# Patient Record
Sex: MALE | Race: WHITE | HISPANIC OR LATINO | Employment: UNEMPLOYED | URBAN - METROPOLITAN AREA
[De-identification: names, ages, dates, MRNs, and addresses within clinical notes are randomized per-mention and may not be internally consistent; named-entity substitution may affect disease eponyms.]

---

## 2023-05-16 ENCOUNTER — TRANSFERRED RECORDS (OUTPATIENT)
Dept: HEALTH INFORMATION MANAGEMENT | Facility: CLINIC | Age: 78
End: 2023-05-16

## 2023-06-21 ENCOUNTER — MEDICAL CORRESPONDENCE (OUTPATIENT)
Dept: HEALTH INFORMATION MANAGEMENT | Facility: CLINIC | Age: 78
End: 2023-06-21

## 2023-06-22 ENCOUNTER — TRANSCRIBE ORDERS (OUTPATIENT)
Dept: OTHER | Age: 78
End: 2023-06-22

## 2023-06-22 DIAGNOSIS — H33.313 RETINAL TEAR OF BOTH EYES: Primary | ICD-10-CM

## 2023-06-26 ENCOUNTER — TELEPHONE (OUTPATIENT)
Dept: OPHTHALMOLOGY | Facility: CLINIC | Age: 78
End: 2023-06-26

## 2023-06-26 NOTE — TELEPHONE ENCOUNTER
[facility] Call Center    Phone Message    May a detailed message be left on voicemail: yes     Reason for Call: Appointment Intake    Referring Provider Name:  Angelina Haskins, [facility]  Diagnosis and/or Symptoms: Retinal tear of both eyes    Urgent ref 3-5 days, first available writer could schedule is 7/26, scheduled and put on wait list, please review and contact Anne for sched options    Thank you     Action Taken: Message routed to:  [facility] ([facility] eye    Travel Screening: Not Applicable

## 2023-06-26 NOTE — TELEPHONE ENCOUNTER
Appointment made.  Financial counselor notified as patient's daughter would like a call on approximate billing.

## 2023-08-15 DIAGNOSIS — H33.313 RETINAL TEAR OF BOTH EYES: Primary | ICD-10-CM

## 2023-11-08 ENCOUNTER — OFFICE VISIT (OUTPATIENT)
Dept: OPHTHALMOLOGY | Facility: CLINIC | Age: 78
End: 2023-11-08
Attending: OPTOMETRIST

## 2023-11-08 DIAGNOSIS — H25.093 OTHER AGE-RELATED INCIPIENT CATARACT OF BOTH EYES: ICD-10-CM

## 2023-11-08 DIAGNOSIS — H52.223 REGULAR ASTIGMATISM OF BOTH EYES: ICD-10-CM

## 2023-11-08 DIAGNOSIS — D31.32 CHOROIDAL NEVUS OF LEFT EYE: ICD-10-CM

## 2023-11-08 DIAGNOSIS — H04.123 DRY EYE SYNDROME OF BOTH EYES: ICD-10-CM

## 2023-11-08 DIAGNOSIS — H33.193 BULLOUS RETINOSCHISIS OF BOTH EYES: Primary | ICD-10-CM

## 2023-11-08 DIAGNOSIS — H04.129 DRY EYE: ICD-10-CM

## 2023-11-08 PROCEDURE — 92250 FUNDUS PHOTOGRAPHY W/I&R: CPT | Performed by: OPHTHALMOLOGY

## 2023-11-08 PROCEDURE — 76516 ECHO EXAM OF EYE: CPT | Performed by: OPHTHALMOLOGY

## 2023-11-08 PROCEDURE — 99212 OFFICE O/P EST SF 10 MIN: CPT | Performed by: OPHTHALMOLOGY

## 2023-11-08 PROCEDURE — 92025 CPTRIZED CORNEAL TOPOGRAPHY: CPT | Performed by: OPHTHALMOLOGY

## 2023-11-08 PROCEDURE — 92134 CPTRZ OPH DX IMG PST SGM RTA: CPT | Performed by: OPHTHALMOLOGY

## 2023-11-08 PROCEDURE — 99204 OFFICE O/P NEW MOD 45 MIN: CPT | Performed by: OPHTHALMOLOGY

## 2023-11-08 PROCEDURE — 99207 FUNDUS PHOTOS OU (BOTH EYES): CPT | Mod: 26 | Performed by: OPHTHALMOLOGY

## 2023-11-08 ASSESSMENT — CONF VISUAL FIELD
OS_SUPERIOR_NASAL_RESTRICTION: 0
OS_NORMAL: 1
OS_SUPERIOR_TEMPORAL_RESTRICTION: 0
OD_NORMAL: 1
OS_INFERIOR_TEMPORAL_RESTRICTION: 0
OD_SUPERIOR_NASAL_RESTRICTION: 0
OS_INFERIOR_NASAL_RESTRICTION: 0
OD_SUPERIOR_TEMPORAL_RESTRICTION: 0
OD_INFERIOR_TEMPORAL_RESTRICTION: 0
OD_INFERIOR_NASAL_RESTRICTION: 0

## 2023-11-08 ASSESSMENT — VISUAL ACUITY
OS_CC: 20/50
OD_CC: 20/40
METHOD: SNELLEN - LINEAR
OS_PH_CC: 20/40

## 2023-11-08 ASSESSMENT — REFRACTION_WEARINGRX
OD_SPHERE: +0.75
OS_SPHERE: +1.25
OD_ADD: +3.00
OS_ADD: +3.00
OD_AXIS: 175
OS_CYLINDER: +0.75
OS_AXIS: 039
OD_CYLINDER: +0.25

## 2023-11-08 ASSESSMENT — EXTERNAL EXAM - RIGHT EYE: OD_EXAM: NORMAL

## 2023-11-08 ASSESSMENT — TONOMETRY
OD_IOP_MMHG: 19
IOP_METHOD: TONOPEN
OS_IOP_MMHG: 16

## 2023-11-08 ASSESSMENT — SLIT LAMP EXAM - LIDS: COMMENTS: NORMAL

## 2023-11-08 NOTE — PROGRESS NOTES
CC -   retinal tears    INTERVAL HISTORY - Initial visit    HPI -   Zeferino Odell is a  78 year old year-old patient referred by [facility] for retinal tears.       PAST OCULAR SURGERY      RETINAL IMAGING:  Optos/OCT 11/8/23  Each eye: PHF attached; foveal contour within normal limits; left eye with small peripapillary serous PED  Retinoschisis ou  Left eye choroidal nevus minimally elevated with adjacent SRF      ASSESSMENT & PLAN    1. Bullous retinoschisis of both eyes    2. Retinal tear of both eyes    3. Other age-related incipient cataract of both eyes    4. Dry eye    5. Choroidal nevus of left eye      This is bollous retinoschisis and not RRD; may consider placing barricade laser in future but now view is too dim because of cataracts  Would recommend CE IOL right eye first and then left eye   Risks of surgery including but not limited to infection (rare but a devastating complication that will need intraocular antibiotics injection and possibly more surgeries), risk of retinal detachment, dropped nuclear pieces or dropped intraocular lens, glaucoma may need further medications, corneal edema that may need a long course of medication or even surgery were discussed  In length with the patient. We discussed about the benefits of CE IOL surgery and its alternatives. All the questions answered. Zeferino and his son, Dez who is translating for us, agreed and wanted to proceed.   Case request placed; will schedule left eye 1-2 w after the right eye   Resident/fellow participation  POD1 and POW1 and POM1    Left eye pigmented lesion will be observed closely for signs of thickening; refer to Dr. Fox after CE IOL    Please call [phone] (Dez, son, who is authorized by Zeferino to discuss scheduling)  Patient does not have insurance. Please make sure financial assistance is contacted.       Complete documentation of historical and exam elements from today's encounter can be found in the full  encounter summary report (not reduplicated in this progress note). I personally obtained the chief complaint(s) and history of present illness.  I confirmed and edited as necessary the review of systems, past medical/surgical history, family history, social history, and examination findings as documented by others; and I examined the patient myself. I personally reviewed the relevant tests, images, and reports as documented above. I formulated and edited as necessary the assessment and plan and discussed the findings and management plan with the patient and family.     Scot Corral MD, PhD
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
## 2023-11-14 ENCOUNTER — TELEPHONE (OUTPATIENT)
Dept: OPHTHALMOLOGY | Facility: CLINIC | Age: 78
End: 2023-11-14

## 2023-11-14 ENCOUNTER — HOSPITAL ENCOUNTER (OUTPATIENT)
Facility: CLINIC | Age: 78
End: 2023-11-14
Attending: OPHTHALMOLOGY | Admitting: OPHTHALMOLOGY

## 2023-11-14 PROBLEM — H25.093 OTHER AGE-RELATED INCIPIENT CATARACT OF BOTH EYES: Status: ACTIVE | Noted: 2023-11-08

## 2023-11-14 NOTE — TELEPHONE ENCOUNTER
Patient is schedule for surgery with: Dr. Corral    Surgery Date: 12/11     Location: [facility]    H&P: to be completed by Primary Care team - patient instructed to schedule [facility]     Post-op: 12/12    Patient will receive a phone call from pre-admission nurses 1-2 days prior to surgery with arrival time and NPO instructions.    Patient aware times are subject to change up until day before surgery.     Patient questions/concerns: N/A     Surgery packet to be sent via US mail on 11/14      Cande Gandhi on 11/14/2023 at 11:50 AM

## 2023-12-04 ENCOUNTER — TRANSFERRED RECORDS (OUTPATIENT)
Dept: MULTI SPECIALTY CLINIC | Facility: CLINIC | Age: 78
End: 2023-12-04

## 2023-12-04 LAB
CREATININE (EXTERNAL): 1.14 MG/DL (ref 0.7–1.28)
GFR ESTIMATED (EXTERNAL): 65 ML/MIN/1.73M2
GLUCOSE (EXTERNAL): 91 MG/DL (ref 65–99)
POTASSIUM (EXTERNAL): 3.9 MMOL/L (ref 3.5–5.3)

## 2023-12-07 RX ORDER — VALSARTAN 160 MG/1
160 TABLET ORAL DAILY
COMMUNITY
Start: 2023-09-11

## 2023-12-07 RX ORDER — HYDROCHLOROTHIAZIDE 12.5 MG/1
TABLET ORAL
COMMUNITY
Start: 2023-09-11

## 2023-12-07 RX ORDER — TERAZOSIN 1 MG/1
2 CAPSULE ORAL AT BEDTIME
COMMUNITY
Start: 2023-09-11

## 2023-12-10 ENCOUNTER — ANESTHESIA EVENT (OUTPATIENT)
Dept: SURGERY | Facility: CLINIC | Age: 78
End: 2023-12-10
Payer: COMMERCIAL

## 2023-12-11 ENCOUNTER — TELEPHONE (OUTPATIENT)
Dept: OPHTHALMOLOGY | Facility: CLINIC | Age: 78
End: 2023-12-11

## 2023-12-11 ENCOUNTER — APPOINTMENT (OUTPATIENT)
Dept: INTERPRETER SERVICES | Facility: CLINIC | Age: 78
End: 2023-12-11
Attending: OPHTHALMOLOGY
Payer: COMMERCIAL

## 2023-12-11 ENCOUNTER — HOSPITAL ENCOUNTER (OUTPATIENT)
Facility: CLINIC | Age: 78
Discharge: HOME | End: 2023-12-11
Attending: OPHTHALMOLOGY | Admitting: OPHTHALMOLOGY
Payer: COMMERCIAL

## 2023-12-11 ENCOUNTER — ANESTHESIA (OUTPATIENT)
Dept: SURGERY | Facility: CLINIC | Age: 78
End: 2023-12-11
Payer: COMMERCIAL

## 2023-12-11 VITALS
BODY MASS INDEX: 24.46 KG/M2 | WEIGHT: 165.12 LBS | SYSTOLIC BLOOD PRESSURE: 114 MMHG | OXYGEN SATURATION: 97 % | RESPIRATION RATE: 16 BRPM | TEMPERATURE: 97.2 F | HEIGHT: 69 IN | DIASTOLIC BLOOD PRESSURE: 64 MMHG | HEART RATE: 64 BPM

## 2023-12-11 DIAGNOSIS — H25.093 OTHER AGE-RELATED INCIPIENT CATARACT OF BOTH EYES: ICD-10-CM

## 2023-12-11 DIAGNOSIS — H25.093 OTHER AGE-RELATED INCIPIENT CATARACT OF BOTH EYES: Primary | ICD-10-CM

## 2023-12-11 PROCEDURE — 710N000012 HC RECOVERY PHASE 2, PER MINUTE: Performed by: OPHTHALMOLOGY

## 2023-12-11 PROCEDURE — 250N000011 HC RX IP 250 OP 636

## 2023-12-11 PROCEDURE — 258N000003 HC RX IP 258 OP 636

## 2023-12-11 PROCEDURE — 250N000009 HC RX 250: Performed by: STUDENT IN AN ORGANIZED HEALTH CARE EDUCATION/TRAINING PROGRAM

## 2023-12-11 PROCEDURE — 272N000002 HC OR SUPPLY OTHER OPNP: Performed by: OPHTHALMOLOGY

## 2023-12-11 PROCEDURE — 272N000001 HC OR GENERAL SUPPLY STERILE: Performed by: OPHTHALMOLOGY

## 2023-12-11 PROCEDURE — 250N000009 HC RX 250

## 2023-12-11 PROCEDURE — 66850 REMOVAL OF LENS MATERIAL: CPT | Mod: RT | Performed by: OPHTHALMOLOGY

## 2023-12-11 PROCEDURE — V2632 POST CHMBR INTRAOCULAR LENS: HCPCS | Performed by: OPHTHALMOLOGY

## 2023-12-11 PROCEDURE — 250N000011 HC RX IP 250 OP 636: Performed by: OPHTHALMOLOGY

## 2023-12-11 PROCEDURE — 250N000013 HC RX MED GY IP 250 OP 250 PS 637

## 2023-12-11 PROCEDURE — 370N000017 HC ANESTHESIA TECHNICAL FEE, PER MIN: Performed by: OPHTHALMOLOGY

## 2023-12-11 PROCEDURE — 250N000009 HC RX 250: Performed by: OPHTHALMOLOGY

## 2023-12-11 PROCEDURE — 999N000141 HC STATISTIC PRE-PROCEDURE NURSING ASSESSMENT: Performed by: OPHTHALMOLOGY

## 2023-12-11 PROCEDURE — 360N000076 HC SURGERY LEVEL 3, PER MIN: Performed by: OPHTHALMOLOGY

## 2023-12-11 RX ORDER — SODIUM CHLORIDE, SODIUM LACTATE, POTASSIUM CHLORIDE, CALCIUM CHLORIDE 600; 310; 30; 20 MG/100ML; MG/100ML; MG/100ML; MG/100ML
INJECTION, SOLUTION INTRAVENOUS CONTINUOUS PRN
Status: DISCONTINUED | OUTPATIENT
Start: 2023-12-11 | End: 2023-12-11

## 2023-12-11 RX ORDER — ACETAMINOPHEN 325 MG/1
975 TABLET ORAL ONCE
Status: COMPLETED | OUTPATIENT
Start: 2023-12-11 | End: 2023-12-11

## 2023-12-11 RX ORDER — KETOROLAC TROMETHAMINE 5 MG/ML
1 SOLUTION OPHTHALMIC 4 TIMES DAILY
Qty: 5 ML | Refills: 0 | Status: SHIPPED | OUTPATIENT
Start: 2023-12-11

## 2023-12-11 RX ORDER — BALANCED SALT SOLUTION 6.4; .75; .48; .3; 3.9; 1.7 MG/ML; MG/ML; MG/ML; MG/ML; MG/ML; MG/ML
SOLUTION OPHTHALMIC PRN
Status: DISCONTINUED | OUTPATIENT
Start: 2023-12-11 | End: 2023-12-11 | Stop reason: HOSPADM

## 2023-12-11 RX ORDER — SODIUM CHLORIDE, SODIUM LACTATE, POTASSIUM CHLORIDE, CALCIUM CHLORIDE 600; 310; 30; 20 MG/100ML; MG/100ML; MG/100ML; MG/100ML
INJECTION, SOLUTION INTRAVENOUS CONTINUOUS
Status: DISCONTINUED | OUTPATIENT
Start: 2023-12-11 | End: 2023-12-11 | Stop reason: HOSPADM

## 2023-12-11 RX ORDER — PROPARACAINE HYDROCHLORIDE 5 MG/ML
SOLUTION/ DROPS OPHTHALMIC PRN
Status: DISCONTINUED | OUTPATIENT
Start: 2023-12-11 | End: 2023-12-11 | Stop reason: HOSPADM

## 2023-12-11 RX ORDER — TETRACAINE HYDROCHLORIDE 5 MG/ML
SOLUTION OPHTHALMIC PRN
Status: DISCONTINUED | OUTPATIENT
Start: 2023-12-11 | End: 2023-12-11 | Stop reason: HOSPADM

## 2023-12-11 RX ORDER — OXYCODONE HYDROCHLORIDE 10 MG/1
10 TABLET ORAL
Status: DISCONTINUED | OUTPATIENT
Start: 2023-12-11 | End: 2023-12-11 | Stop reason: HOSPADM

## 2023-12-11 RX ORDER — ONDANSETRON 2 MG/ML
4 INJECTION INTRAMUSCULAR; INTRAVENOUS EVERY 30 MIN PRN
Status: DISCONTINUED | OUTPATIENT
Start: 2023-12-11 | End: 2023-12-11 | Stop reason: HOSPADM

## 2023-12-11 RX ORDER — CYCLOPENTOLAT/TROPIC/PHENYLEPH 1%-1%-2.5%
1 DROPS (EA) OPHTHALMIC (EYE)
Status: COMPLETED | OUTPATIENT
Start: 2023-12-11 | End: 2023-12-11

## 2023-12-11 RX ORDER — HALOPERIDOL 5 MG/ML
1 INJECTION INTRAMUSCULAR
Status: DISCONTINUED | OUTPATIENT
Start: 2023-12-11 | End: 2023-12-11 | Stop reason: HOSPADM

## 2023-12-11 RX ORDER — PREDNISOLONE ACETATE 10 MG/ML
1 SUSPENSION/ DROPS OPHTHALMIC 4 TIMES DAILY
Qty: 5 ML | Refills: 0 | Status: SHIPPED | OUTPATIENT
Start: 2023-12-11 | End: 2023-12-11

## 2023-12-11 RX ORDER — FENTANYL CITRATE 50 UG/ML
INJECTION, SOLUTION INTRAMUSCULAR; INTRAVENOUS PRN
Status: DISCONTINUED | OUTPATIENT
Start: 2023-12-11 | End: 2023-12-11

## 2023-12-11 RX ORDER — FENTANYL CITRATE 50 UG/ML
25 INJECTION, SOLUTION INTRAMUSCULAR; INTRAVENOUS EVERY 5 MIN PRN
Status: DISCONTINUED | OUTPATIENT
Start: 2023-12-11 | End: 2023-12-11 | Stop reason: HOSPADM

## 2023-12-11 RX ORDER — PREDNISOLONE ACETATE 10 MG/ML
1 SUSPENSION/ DROPS OPHTHALMIC 4 TIMES DAILY
Qty: 5 ML | Refills: 0 | Status: SHIPPED | OUTPATIENT
Start: 2023-12-11 | End: 2023-12-20

## 2023-12-11 RX ORDER — MOXIFLOXACIN 5 MG/ML
1 SOLUTION/ DROPS OPHTHALMIC 3 TIMES DAILY
Qty: 3 ML | Refills: 0 | Status: SHIPPED | OUTPATIENT
Start: 2023-12-11

## 2023-12-11 RX ORDER — PROPARACAINE HYDROCHLORIDE 5 MG/ML
1 SOLUTION/ DROPS OPHTHALMIC ONCE
Status: COMPLETED | OUTPATIENT
Start: 2023-12-11 | End: 2023-12-11

## 2023-12-11 RX ORDER — LIDOCAINE 40 MG/G
CREAM TOPICAL
Status: DISCONTINUED | OUTPATIENT
Start: 2023-12-11 | End: 2023-12-11 | Stop reason: HOSPADM

## 2023-12-11 RX ORDER — LIDOCAINE HYDROCHLORIDE 10 MG/ML
INJECTION, SOLUTION EPIDURAL; INFILTRATION; INTRACAUDAL; PERINEURAL PRN
Status: DISCONTINUED | OUTPATIENT
Start: 2023-12-11 | End: 2023-12-11 | Stop reason: HOSPADM

## 2023-12-11 RX ORDER — OXYCODONE HYDROCHLORIDE 5 MG/1
5 TABLET ORAL
Status: DISCONTINUED | OUTPATIENT
Start: 2023-12-11 | End: 2023-12-11 | Stop reason: HOSPADM

## 2023-12-11 RX ORDER — HYDROMORPHONE HYDROCHLORIDE 1 MG/ML
0.4 INJECTION, SOLUTION INTRAMUSCULAR; INTRAVENOUS; SUBCUTANEOUS EVERY 5 MIN PRN
Status: DISCONTINUED | OUTPATIENT
Start: 2023-12-11 | End: 2023-12-11 | Stop reason: HOSPADM

## 2023-12-11 RX ORDER — ACETAMINOPHEN 325 MG/1
975 TABLET ORAL ONCE
Status: DISCONTINUED | OUTPATIENT
Start: 2023-12-11 | End: 2023-12-11 | Stop reason: HOSPADM

## 2023-12-11 RX ORDER — FENTANYL CITRATE 50 UG/ML
25 INJECTION, SOLUTION INTRAMUSCULAR; INTRAVENOUS
Status: DISCONTINUED | OUTPATIENT
Start: 2023-12-11 | End: 2023-12-11 | Stop reason: HOSPADM

## 2023-12-11 RX ORDER — FENTANYL CITRATE 50 UG/ML
50 INJECTION, SOLUTION INTRAMUSCULAR; INTRAVENOUS EVERY 5 MIN PRN
Status: DISCONTINUED | OUTPATIENT
Start: 2023-12-11 | End: 2023-12-11 | Stop reason: HOSPADM

## 2023-12-11 RX ORDER — ONDANSETRON 4 MG/1
4 TABLET, ORALLY DISINTEGRATING ORAL EVERY 30 MIN PRN
Status: DISCONTINUED | OUTPATIENT
Start: 2023-12-11 | End: 2023-12-11 | Stop reason: HOSPADM

## 2023-12-11 RX ORDER — HYDROXYZINE HYDROCHLORIDE 10 MG/1
10 TABLET, FILM COATED ORAL EVERY 6 HOURS PRN
Status: DISCONTINUED | OUTPATIENT
Start: 2023-12-11 | End: 2023-12-11 | Stop reason: HOSPADM

## 2023-12-11 RX ORDER — MOXIFLOXACIN IN NACL,ISO-OS/PF 1.6 MG/ML
SYRINGE (ML) INTRAOCULAR PRN
Status: DISCONTINUED | OUTPATIENT
Start: 2023-12-11 | End: 2023-12-11 | Stop reason: HOSPADM

## 2023-12-11 RX ORDER — HYDROMORPHONE HYDROCHLORIDE 1 MG/ML
0.2 INJECTION, SOLUTION INTRAMUSCULAR; INTRAVENOUS; SUBCUTANEOUS EVERY 5 MIN PRN
Status: DISCONTINUED | OUTPATIENT
Start: 2023-12-11 | End: 2023-12-11 | Stop reason: HOSPADM

## 2023-12-11 RX ORDER — PROPOFOL 10 MG/ML
INJECTION, EMULSION INTRAVENOUS CONTINUOUS PRN
Status: DISCONTINUED | OUTPATIENT
Start: 2023-12-11 | End: 2023-12-11

## 2023-12-11 RX ADMIN — DEXMEDETOMIDINE HYDROCHLORIDE 4 MCG: 100 INJECTION, SOLUTION INTRAVENOUS at 16:23

## 2023-12-11 RX ADMIN — PROPOFOL 10 MG: 10 INJECTION, EMULSION INTRAVENOUS at 16:10

## 2023-12-11 RX ADMIN — ACETAMINOPHEN 975 MG: 325 TABLET, FILM COATED ORAL at 11:51

## 2023-12-11 RX ADMIN — MIDAZOLAM 1 MG: 1 INJECTION INTRAMUSCULAR; INTRAVENOUS at 15:17

## 2023-12-11 RX ADMIN — PROPARACAINE HYDROCHLORIDE 1 DROP: 5 SOLUTION/ DROPS OPHTHALMIC at 11:52

## 2023-12-11 RX ADMIN — MIDAZOLAM 1 MG: 1 INJECTION INTRAMUSCULAR; INTRAVENOUS at 14:56

## 2023-12-11 RX ADMIN — FENTANYL CITRATE 25 MCG: 50 INJECTION INTRAMUSCULAR; INTRAVENOUS at 15:49

## 2023-12-11 RX ADMIN — Medication 1 DROP: at 11:55

## 2023-12-11 RX ADMIN — PROPOFOL 10 MG: 10 INJECTION, EMULSION INTRAVENOUS at 15:58

## 2023-12-11 RX ADMIN — PROPOFOL 10 MG: 10 INJECTION, EMULSION INTRAVENOUS at 16:04

## 2023-12-11 RX ADMIN — DEXMEDETOMIDINE HYDROCHLORIDE 8 MCG: 100 INJECTION, SOLUTION INTRAVENOUS at 16:19

## 2023-12-11 RX ADMIN — SODIUM CHLORIDE, POTASSIUM CHLORIDE, SODIUM LACTATE AND CALCIUM CHLORIDE: 600; 310; 30; 20 INJECTION, SOLUTION INTRAVENOUS at 14:45

## 2023-12-11 RX ADMIN — FENTANYL CITRATE 25 MCG: 50 INJECTION INTRAMUSCULAR; INTRAVENOUS at 15:21

## 2023-12-11 RX ADMIN — FENTANYL CITRATE 25 MCG: 50 INJECTION INTRAMUSCULAR; INTRAVENOUS at 16:12

## 2023-12-11 RX ADMIN — Medication 1 DROP: at 12:02

## 2023-12-11 RX ADMIN — Medication 1 DROP: at 12:00

## 2023-12-11 RX ADMIN — DEXMEDETOMIDINE HYDROCHLORIDE 4 MCG: 100 INJECTION, SOLUTION INTRAVENOUS at 16:36

## 2023-12-11 RX ADMIN — FENTANYL CITRATE 25 MCG: 50 INJECTION INTRAMUSCULAR; INTRAVENOUS at 16:42

## 2023-12-11 RX ADMIN — ACETAMINOPHEN 975 MG: 325 TABLET, FILM COATED ORAL at 17:57

## 2023-12-11 RX ADMIN — PROPOFOL 10 MG: 10 INJECTION, EMULSION INTRAVENOUS at 16:21

## 2023-12-11 ASSESSMENT — ACTIVITIES OF DAILY LIVING (ADL)
ADLS_ACUITY_SCORE: 35

## 2023-12-11 NOTE — ANESTHESIA CARE TRANSFER NOTE
Patient: Zeferino dOell    Procedure: Procedure(s):  complicated Extracapsular cataract extration       Diagnosis: Other age-related incipient cataract of both eyes [H25.093]  Diagnosis Additional Information: No value filed.    Anesthesia Type:   MAC     Note:    Oropharynx: oropharynx clear of all foreign objects and spontaneously breathing  Level of Consciousness: awake  Oxygen Supplementation: room air    Independent Airway: airway patency satisfactory and stable  Dentition: dentition unchanged  Vital Signs Stable: post-procedure vital signs reviewed and stable  Report to RN Given: handoff report given  Patient transferred to: Phase II    Handoff Report: Identifed the Patient, Identified the Reponsible Provider, Reviewed the pertinent medical history, Discussed the surgical course, Reviewed Intra-OP anesthesia mangement and issues during anesthesia, Set expectations for post-procedure period and Allowed opportunity for questions and acknowledgement of understanding      Vitals:  Vitals Value Taken Time   BP 99/57 12/11/23 1648   Temp     Pulse 62 12/11/23 1648   Resp     SpO2 97 % 12/11/23 1656   Vitals shown include unfiled device data.    Electronically Signed By: CINDY Medrano CRNA  December 11, 2023  4:58 PM

## 2023-12-11 NOTE — BRIEF OP NOTE
[facility]    Brief Operative Note    Pre-operative diagnosis: Other age-related incipient cataract of both eyes [H25.093]  Post-operative diagnosis Same as pre-operative diagnosis    Procedure: complicated Extracapsular cataract extration, Right - Eye    Surgeon: Surgeon(s) and Role:     * Scot De La Paz MD - Primary     * Gian Kapoor MD - Fellow - Assisting  Anesthesia: MAC with Topical   Estimated Blood Loss: Less than 10 ml    Drains: None  Specimens: * No specimens in log *  Findings:   None.  Complications: None.  Implants:

## 2023-12-11 NOTE — OP NOTE
SURGEON: Scot Corral MD   Assistant surgeon: Gian Diaz MD  PREOPERATIVE DIAGNOSIS:  visually significant cataract right eye   POSTOPERATIVE DIAGNOSIS: same  NAME OF THE PROCEDURE: Complex phacoemulsification without intraocular lens implantation right eye   ANESTHESIA: Monitored anesthesia care and topical  COMPLICATIONS: none  INDICATIONS: Zeferino Odell is a 78 year old with diagnosis of visually significant cataract, here for cataract surgery    DESCRIPTION OF THE PROCEDURE:  The patient was taken to the operative room where intravenous sedation was administered and topical anesthesia.    The operative eye was prepped and draped in the usual sterile surgical fashion for ophthalmic surgery, including the installation of one drop of 5% Povidone Iodine.  A sterile drape was placed over the face and body and a lid speculum was inserted.      With the use of a Supersharp blade and 0.12 forceps, a paracentesis was created at the 10 o'clock position, and viscoelastic was injected into the anterior chamber using a canula.  A 2.5 mm keratome was then used to construct a clear corneal incision at the 8 o'clock position. Using Utrata forceps and cystotome needle, a continuous curvilinear capsulorrhexis was created and hydrodissection was undertaken with the use of BSS.  The nucleus was found to be freely mobile and then removed by phacoemulsification using a divide and conquer technique.  The remaining elements of cortex were then removed with irrigation/aspiration.  An IOL,was injected into the anterior chamber but while in AC, the lens was found to have a scratch in the area close to the center of the optic. The keratome was used to increase the main wound size, forceps were used to pull the lens out and  hemorrhage was noticed in the AC with a superior iridodialysis blocking the view and red reflex. Iris was prolapsing from the wound constantly with positive vitreous pressure. Four 10- nylon suture were placed   at the wound incision and found to be water tight. Iris was not incarcerated in the wound.  The lid speculum was removed. Intracameral moxifloxacin was administered. The eye was cleaned with wet and dry gauze. Maxitrol ointment was placed on the eye.  A patch and shield were placed over the eye.  The patient was discharge in stable condition having tolerated the procedure well.  Scot Corral MD

## 2023-12-11 NOTE — DISCHARGE INSTRUCTIONS
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
## 2023-12-12 ENCOUNTER — OFFICE VISIT (OUTPATIENT)
Dept: OPHTHALMOLOGY | Facility: CLINIC | Age: 78
End: 2023-12-12
Attending: OPHTHALMOLOGY
Payer: COMMERCIAL

## 2023-12-12 DIAGNOSIS — H21.531 IRIDODIALYSIS OF RIGHT EYE: ICD-10-CM

## 2023-12-12 DIAGNOSIS — Z48.810 AFTERCARE FOLLOWING SURGERY OF A SENSE ORGAN: Primary | ICD-10-CM

## 2023-12-12 DIAGNOSIS — H33.193 BULLOUS RETINOSCHISIS OF BOTH EYES: ICD-10-CM

## 2023-12-12 PROCEDURE — G0463 HOSPITAL OUTPT CLINIC VISIT: HCPCS | Performed by: OPHTHALMOLOGY

## 2023-12-12 PROCEDURE — 76512 OPH US DX B-SCAN: CPT | Performed by: OPHTHALMOLOGY

## 2023-12-12 PROCEDURE — 99024 POSTOP FOLLOW-UP VISIT: CPT | Performed by: OPHTHALMOLOGY

## 2023-12-12 ASSESSMENT — TONOMETRY
IOP_METHOD: TONOPEN
OD_IOP_MMHG: 19

## 2023-12-12 ASSESSMENT — VISUAL ACUITY
METHOD: SNELLEN - LINEAR
OD_SC: CF@ 1'

## 2023-12-12 ASSESSMENT — SLIT LAMP EXAM - LIDS: COMMENTS: NORMAL

## 2023-12-12 NOTE — NURSING NOTE
Chief Complaints and History of Present Illnesses   Patient presents with    Post Op (Ophthalmology) Right Eye     1 day PO CE IOL RE.     Chief Complaint(s) and History of Present Illness(es)       Post Op (Ophthalmology) Right Eye              Laterality: right eye    Onset: sudden    Onset: 1 day ago    Associated symptoms: dryness, eye pain, photophobia and burning    Comments: 1 day PO CE IOL RE.              Comments    Pt reports little pain 3/10.  Shield stayed on.  Pt slept well.    SINDY Montero December 12, 2023 10:41 AM

## 2023-12-12 NOTE — TELEPHONE ENCOUNTER
Paged by triage RN that patient's son, Dez, calling in with questions about eye drops. Patient underwent Complex phacoemulsification without intraocular lens implantation with Dr. Corral today. He states they went to  post op eye drops at [facility] but were only given 2/3 drops. States they were able to  ketorolac and moxifloxacin but not prednisolone.    Resent Rx for prednisolone to their requested [facility] pharmacy. Patient will keep patch in place over night and follow up with Dr. Corral in clinic tomorrow for POD1.     Janet Day MD  Resident Physician, PGY-3  Department of Ophthalmology

## 2023-12-12 NOTE — PROGRESS NOTES
Postoperative day 1 status post complex cataract extraction with iatrogenic iridodialysis right eye 12/11/23    Slept well  Retina attached  Doing well    Plan:  Position: face down  No aviation  No heavy lifting   Shield at night  Retina detachment and endophthalmitis precautions were discussed with the patient and was asked to return if any of the those occur    Medications to operative eye    Prednisolone (white or pink top) 6/day right eye   Moxifloxacin (tan top) 4/day right eye; stop after 1 week  Ketorolac (gray top) 4/day right eye       RTC 1-2 w for DFE and optos and OCT and for planning for secondary IOL right eye and repair of iridodialysis        Complete documentation of historical and exam elements from today's encounter can be found in the full encounter summary report (not reduplicated in this progress note). I personally obtained the chief complaint(s) and history of present illness.  I confirmed and edited as necessary the review of systems, past medical/surgical history, family history, social history, and examination findings as documented by others; and I examined the patient myself. I personally reviewed the relevant tests, images, and reports as documented above. I formulated and edited as necessary the assessment and plan and discussed the findings and management plan with the patient and family.    Scot Corral MD  , Vitreoretinal surgery   Department of Ophthalmology  [facility]

## 2023-12-12 NOTE — PATIENT INSTRUCTIONS
-Prednisolone (white or pink top) 6/day right eye     -Moxifloxacin (tan top) 4/day right eye; stop after 1 week    -Ketorolac (gray top) 4/day right eye

## 2023-12-12 NOTE — ANESTHESIA POSTPROCEDURE EVALUATION
Patient: Zeferino Odell    Procedure: Procedure(s):  complicated Extracapsular cataract extration       Anesthesia Type:  MAC    Note:  Disposition: Outpatient   Postop Pain Control: Uneventful            Sign Out: Well controlled pain   PONV: No   Neuro/Psych: Uneventful            Sign Out: Acceptable/Baseline neuro status   Airway/Respiratory: Uneventful            Sign Out: Acceptable/Baseline resp. status   CV/Hemodynamics: Uneventful            Sign Out: Acceptable CV status; No obvious hypovolemia; No obvious fluid overload   Other NRE: NONE   DID A NON-ROUTINE EVENT OCCUR? No       Last vitals:  Vitals Value Taken Time   /64 12/11/23 1809   Temp 36.2  C (97.2  F) 12/11/23 1809   Pulse 64 12/11/23 1809   Resp 16 12/11/23 1809   SpO2 95 % 12/11/23 1811   Vitals shown include unfiled device data.    Electronically Signed By: Moody Loza MD  December 12, 2023  2:33 AM

## 2023-12-20 ENCOUNTER — OFFICE VISIT (OUTPATIENT)
Dept: OPHTHALMOLOGY | Facility: CLINIC | Age: 78
End: 2023-12-20
Attending: OPHTHALMOLOGY
Payer: COMMERCIAL

## 2023-12-20 DIAGNOSIS — H27.01 APHAKIA OF RIGHT EYE: ICD-10-CM

## 2023-12-20 DIAGNOSIS — Z98.49: Primary | ICD-10-CM

## 2023-12-20 DIAGNOSIS — H25.093 OTHER AGE-RELATED INCIPIENT CATARACT OF BOTH EYES: ICD-10-CM

## 2023-12-20 DIAGNOSIS — H21.531 IRIDODIALYSIS OF RIGHT EYE: ICD-10-CM

## 2023-12-20 PROCEDURE — G0463 HOSPITAL OUTPT CLINIC VISIT: HCPCS | Performed by: OPHTHALMOLOGY

## 2023-12-20 PROCEDURE — 99207 FUNDUS PHOTOS OU (BOTH EYES): CPT | Mod: 26 | Performed by: OPHTHALMOLOGY

## 2023-12-20 PROCEDURE — 92250 FUNDUS PHOTOGRAPHY W/I&R: CPT | Performed by: OPHTHALMOLOGY

## 2023-12-20 PROCEDURE — 99024 POSTOP FOLLOW-UP VISIT: CPT | Mod: GC | Performed by: OPHTHALMOLOGY

## 2023-12-20 PROCEDURE — 99207 OCT RETINA SPECTRALIS OU (BOTH EYE): CPT | Mod: 26 | Performed by: OPHTHALMOLOGY

## 2023-12-20 PROCEDURE — 92134 CPTRZ OPH DX IMG PST SGM RTA: CPT | Performed by: OPHTHALMOLOGY

## 2023-12-20 RX ORDER — PREDNISOLONE ACETATE 10 MG/ML
1 SUSPENSION/ DROPS OPHTHALMIC 3 TIMES DAILY
Qty: 5 ML | Refills: 1 | Status: SHIPPED | OUTPATIENT
Start: 2023-12-20

## 2023-12-20 ASSESSMENT — VISUAL ACUITY
METHOD: SNELLEN - LINEAR
OS_PH_SC: 20/30
OS_PH_SC+: -1
OS_SC+: -2
OS_SC: 20/50

## 2023-12-20 ASSESSMENT — SLIT LAMP EXAM - LIDS: COMMENTS: NORMAL

## 2023-12-20 ASSESSMENT — TONOMETRY
OS_IOP_MMHG: 10
OD_IOP_MMHG: 20
IOP_METHOD: TONOPEN

## 2023-12-20 NOTE — NURSING NOTE
Chief Complaints and History of Present Illnesses   Patient presents with    Post Op (Ophthalmology) Right Eye     1 week post complex cataract extraction with iatrogenic iridodialysis right eye 12/11/23     Chief Complaint(s) and History of Present Illness(es)       Post Op (Ophthalmology) Right Eye              Comments: 1 week post complex cataract extraction with iatrogenic iridodialysis right eye 12/11/23              Comments    Patient states vision is blurry and stable since last visit right eye. Patient states having small ache and having juarez feeling. Patient mentions having flashes of light and floaters. Patient mentions when brushing teeth accidentally splashing toothpaste into eye when he opened toothpaste  and accidentally  hiding eye with bed sheet while doing bed.     Patient stopped using Prednisolone, maxiflocacin and Ketorolac on Sunday night. Currently not using any eye drops.     Candida Covarrubias 10:05 AM December 20, 2023

## 2023-12-20 NOTE — PROGRESS NOTES
Status post complex cataract extraction with iatrogenic iridodialysis right eye 12/11/23    Stopped using all drops 2 days ago because he states instructions said to only use for 6 days  Retina attached; inferotemporal retinoschisis stable    Left eye stable retinoschisis    Plan:  No heavy lifting   Retina detachment and endophthalmitis precautions were discussed with the patient and was asked to return if any of the those occur    Medications to operative eye    Ok to stop moxifloxacin  Restart Prednisolone (white or pink top) 4/day right eye     Plan for IOL implantation and iris repair in 3-4 w  Risks of surgery including but not limited to infection (rare but a devastating complication that will need intraocular antibiotics injection and possibly more surgeries), risk of retinal detachment, dropped intraocular lens, glaucoma may need further medications, corneal edema that may need a long course of medication or even surgery were discussed  In length with the patient. We discussed about the benefits of IOL surgery and its alternatives. All the questions answered. Zeferino agreed and wanted to proceed.   Case request placed (ideally 1/22/24), come back the week before surgery for reevaluation  Resident/fellow participation  General anesthesia   Zeferino will get his H&P    Left eye cataract NS 2+  Plan for CE IOL under GA after right eye is taken care of (maybe for 1/29/24)    Retinoschisis ou  Appears stable compared to previous optos images; in OCT, there is a subretinal fluid component left eye but doesn't seem to be RRD    Pigmented retinal lesion left eye   Mildly elevated with overlying drusen; neighboring SRF is likely in the setting of retinoschisis; will do b scan and will refer to Dr. Fox after right eye surgery      Janet Day MD  Resident Physician, PGY-3  Department of Ophthalmology        Complete documentation of historical and exam elements from today's encounter can be found in the full  encounter summary report (not reduplicated in this progress note). I personally obtained the chief complaint(s) and history of present illness.  I confirmed and edited as necessary the review of systems, past medical/surgical history, family history, social history, and examination findings as documented by others; and I examined the patient myself. I personally reviewed the relevant tests, images, and reports as documented above. I formulated and edited as necessary the assessment and plan and discussed the findings and management plan with the patient and family.    Scot Corral MD  , Vitreoretinal surgery   Department of Ophthalmology  [facility]

## 2023-12-21 ENCOUNTER — TELEPHONE (OUTPATIENT)
Dept: OPHTHALMOLOGY | Facility: CLINIC | Age: 78
End: 2023-12-21
Payer: COMMERCIAL

## 2023-12-21 NOTE — TELEPHONE ENCOUNTER
Patient is schedule for surgery with: Dr. Corral    Surgery Date: 1/15/24     Location[facility]    H&P: to be completed by Primary Care team - patient instructed to schedule. Patient states they will schedule with PCP     Post-op:  1/16, 1/23, 2/13, in-person visit, with Dr Corral    Patient will receive a phone call from pre-admission nurses 1-2 days prior to surgery with arrival time and NPO instructions.    Patient aware times are subject to change up until day before surgery.     Patient questions/concerns: N/A     Surgery packet was sent via US mail 12/21      Felicita Omalley on 12/21/2023 at 12:03 PM

## 2024-01-02 ENCOUNTER — OFFICE VISIT (OUTPATIENT)
Dept: OPHTHALMOLOGY | Facility: CLINIC | Age: 79
End: 2024-01-02
Attending: OPHTHALMOLOGY
Payer: COMMERCIAL

## 2024-01-02 DIAGNOSIS — H21.531 IRIDODIALYSIS OF RIGHT EYE: Primary | ICD-10-CM

## 2024-01-02 DIAGNOSIS — H21.531 IRIDODIALYSIS OF RIGHT EYE: ICD-10-CM

## 2024-01-02 PROCEDURE — 99214 OFFICE O/P EST MOD 30 MIN: CPT | Mod: 24 | Performed by: OPHTHALMOLOGY

## 2024-01-02 PROCEDURE — G0463 HOSPITAL OUTPT CLINIC VISIT: HCPCS | Performed by: OPHTHALMOLOGY

## 2024-01-02 PROCEDURE — 76512 OPH US DX B-SCAN: CPT | Performed by: OPHTHALMOLOGY

## 2024-01-02 ASSESSMENT — VISUAL ACUITY
OS_CC: 20/40
OS_CC+: -1
OD_CC: CF @3FT
METHOD: SNELLEN - LINEAR

## 2024-01-02 ASSESSMENT — SLIT LAMP EXAM - LIDS
COMMENTS: NORMAL
COMMENTS: NORMAL

## 2024-01-02 ASSESSMENT — TONOMETRY
IOP_METHOD: TONOPEN
OS_IOP_MMHG: 16
OD_IOP_MMHG: 18

## 2024-01-02 ASSESSMENT — EXTERNAL EXAM - LEFT EYE: OS_EXAM: NORMAL

## 2024-01-02 ASSESSMENT — REFRACTION_WEARINGRX
OS_AXIS: 039
OD_CYLINDER: +0.25
OS_ADD: +3.00
OD_AXIS: 175
OS_SPHERE: +1.25
OD_ADD: +3.00
OS_CYLINDER: +0.75
OD_SPHERE: +0.75
SPECS_TYPE: PAL

## 2024-01-02 ASSESSMENT — CUP TO DISC RATIO
OD_RATIO: 0.3
OS_RATIO: 0.3

## 2024-01-02 ASSESSMENT — EXTERNAL EXAM - RIGHT EYE: OD_EXAM: NORMAL

## 2024-01-02 NOTE — PROGRESS NOTES
CC -   Choroidal nevus , left eye    INTERVAL HISTORY - Initial visit with me, no changes since saw Ellis. Referral for nevus OS noted by Ellis, no prior mention  Had  complex CE/IOL OD 12/11/23 with intraoperative iridodialysis planned repair 1/15/24      [facility] -   Zeferino Odell is a  78 year old year-old patient with history of choroidal nevus OS noted in 2023 by Ellis, no prior mention per patient, referred for eval    Iridodialysis OD after CE/IOL 12/11/23, planned repair 1/15/24      PAST OCULAR SURGERY  CE/IOL OD 12/11/23        RETINAL IMAGING:  U/S B scan 1/2/24:  OS: thin membranes c/w schisis, no lesion visible    OCT 12/20/23  OD -  macula - tr ERM, PHF partly attached  OS -  macula - low RPE elevations nasal macula, tr ERM, PHF partly attached   lesion - elevated lesion +drusen, with SRF inferior in region of schisis with outer hole   periphery - schisis no SRF    ASSESSMENT & PLAN    # Choroidal nevus OS   - noted 2023, no prior mention   - + drusen, no orange, +SRF   - pigment anterior to lesion likely 2/2 chronic SRF    - unclear if SRF from nevus or 2/2 schisis associated RD   - monitor with Ellis     - if concern for growth will refer back to me   - advise follow with Optos and OCT given small size      # Retinoschisis OS with schisis-associated RD   - localized SRF inferior to nevus   - suspect mostly 2/2 schisis not from nevus   - monitor    # retinoschisis OD      # Syneresis OU      # Iridodialysis OD   - post CE/IOL   - planned repair by HN      #  NS OS   - planned CE/IOL in future           return to clinic: as scheduled with Ellis Day MD  Resident Physician, PGY-3  Department of Ophthalmology      ATTESTATION     Attending Attestation:     Complete documentation of historical and exam elements from today's encounter can be found in the full encounter summary report (not reduplicated in this progress note).  I personally obtained the chief complaint(s) and history of present  illness.  I confirmed and edited as necessary the review of systems, past medical/surgical history, family history, social history, and examination findings as documented by others; and I examined the patient myself.  I personally reviewed the relevant tests, images, and reports as documented above.  I personally reviewed the ophthalmic test(s) associated with this encounter, agree with the interpretation(s) as documented by the resident/fellow, and have edited the corresponding report(s) as necessary.   I formulated and edited as necessary the assessment and plan and discussed the findings and management plan with the patient and family    Janelle Fox MD, PhD  , Vitreoretinal Surgery  Department of Ophthalmology  [facility]

## 2024-01-02 NOTE — NURSING NOTE
Chief Complaints and History of Present Illnesses   Patient presents with    Retinal Evaluation     Pigmented retinal lesion left eye; Mildly elevated with overlying drusen       Chief Complaint(s) and History of Present Illness(es)       Retinal Evaluation              Comments: Pigmented retinal lesion left eye; Mildly elevated with overlying drusen                Comments    Patient hasn't noticed changes in vision. Patient mentions having bright vision at night. Patient denies eye pain, flashes of light or increase in floater.    Patient has been using Prednisolone 1x a day right eye.    Candida Covarrubias 1:28 PM January 2, 2024

## 2024-01-02 NOTE — Clinical Note
I think he should have the nevus checked in 6 months. I'm happy to do so, but since he was seeing you anyway for his cataract surgery for now I thought you might want to follow the nevus and send him to me if there are any future concerns. The SRF I think is coming from a schisis-associated RD, though hard to tell for sure.

## 2024-01-10 ENCOUNTER — OFFICE VISIT (OUTPATIENT)
Dept: PEDIATRICS | Facility: CLINIC | Age: 79
End: 2024-01-10
Payer: COMMERCIAL

## 2024-01-10 VITALS
BODY MASS INDEX: 25.43 KG/M2 | HEART RATE: 83 BPM | HEIGHT: 67 IN | TEMPERATURE: 97.4 F | SYSTOLIC BLOOD PRESSURE: 136 MMHG | RESPIRATION RATE: 16 BRPM | WEIGHT: 162 LBS | DIASTOLIC BLOOD PRESSURE: 74 MMHG | OXYGEN SATURATION: 96 %

## 2024-01-10 DIAGNOSIS — Z01.818 PREOP GENERAL PHYSICAL EXAM: Primary | ICD-10-CM

## 2024-01-10 DIAGNOSIS — I10 HYPERTENSION, UNSPECIFIED TYPE: ICD-10-CM

## 2024-01-10 DIAGNOSIS — H25.093 OTHER AGE-RELATED INCIPIENT CATARACT OF BOTH EYES: ICD-10-CM

## 2024-01-10 PROCEDURE — 99214 OFFICE O/P EST MOD 30 MIN: CPT | Mod: GC

## 2024-01-10 RX ORDER — PREDNISOLONE ACETATE 10 MG/ML
1 SUSPENSION/ DROPS OPHTHALMIC 3 TIMES DAILY
Qty: 5 ML | Refills: 1 | Status: CANCELLED | OUTPATIENT
Start: 2024-01-10

## 2024-01-10 ASSESSMENT — PAIN SCALES - GENERAL: PAINLEVEL: NO PAIN (0)

## 2024-01-10 NOTE — PROGRESS NOTES
[facility]  [address]  [address]  [place] [address]  Phone: [phone]  Fax: [phone]  Primary Provider: Curry Lawrence  Pre-op Performing Provider:    BONNIE LOPEZ  VIDEO,       PREOPERATIVE EVALUATION:  Today's date: 1/10/2024    Zeferino is a 78 year old, presenting for the following:  Pre-Op Exam    Patient seen with aid of .    Surgical Information:  Surgery/Procedure: Catarct surgery  Surgery Location: [facility]  Surgeon: Janelle Fox  Surgery Date: January 15th, 2024  Time of Surgery: 1:15 PM  Where patient plans to recover: At home with family  Fax number for surgical facility: [phone]    Assessment & Plan     The proposed surgical procedure is considered LOW risk.      Other age-related incipient cataract of both eyes  Chronic for many years      Possible Sleep Apnea: Yes        No data to display               Issues w/ sleep schedule, sleeping more during the day, should not impact surgery     - No identified additional risk factors other than previously addressed    Antiplatelet or Anticoagulation Medication Instructions:   - Patient is on no antiplatelet or anticoagulation medications.   - Bleeding risk is low for this procedure (e.g. dental, skin, cataract).    Additional Medication Instructions:   - ACE/ARB: Continue without modification (e.g., MAC anesthesia, neurosurgery, spine surgery, heart failure, or labile hypertension with risk of hypertension). Will take the day prior and then after surgery    RECOMMENDATION:  APPROVAL GIVEN to proceed with proposed procedure, without further diagnostic evaluation.        Subjective       HPI related to upcoming procedure:     No significant new symptoms. Has been doing well overall. He does have a recent cold but has recovered, has a very mild cough remaining. No concerns related to his surgery. No significant FH. He does have sleep issues and is  sleepy during the day. Never diagnosed with sleep apnea. He has bilateral shoulder pain for the past 6 months. He smoked for 15 years about 1/2 PPD but quit a while ago. He uses very little alcohol. He does note he gets itchy when he uses water here sometimes.        1/10/2024     8:11 AM   Preop Questions   1. Have you ever had a heart attack or stroke? No   2. Have you ever had surgery on your heart or blood vessels, such as a stent placement, a coronary artery bypass, or surgery on an artery in your head, neck, heart, or legs? No   3. Do you have chest pain with activity? No   4. Do you have a history of  heart failure?  No   5. Do you currently have a cold, bronchitis or symptoms of other infection? No   6. Do you have a cough, shortness of breath, or wheezing? No   7. Do you or anyone in your family have previous history of blood clots? No   8. Do you or does anyone in your family have a serious bleeding problem such as prolonged bleeding following surgeries or cuts? No   9. Have you ever had problems with anemia or been told to take iron pills? No   10. Have you had any abnormal blood loss such as black, tarry or bloody stools? No   11. Have you ever had a blood transfusion? YES - not recently   11a. Have you ever had a transfusion reaction? No   12. Are you willing to have a blood transfusion if it is medically needed before, during, or after your surgery? Yes   13. Have you or any of your relatives ever had problems with anesthesia? No   14. Do you have sleep apnea, excessive snoring or daytime drowsiness? YES - snoring   14a. Do you have a CPAP machine? No   15. Do you have any artifical heart valves or other implanted medical devices like a pacemaker, defibrillator, or continuous glucose monitor? No   16. Do you have artificial joints? No   17. Are you allergic to latex? No       Health Care Directive:  Patient does not have a Health Care Directive or Living Will: Discussed advance care planning with  "patient; however, patient declined at this time.    Preoperative Review of :   reviewed - no record of controlled substances prescribed.          Review of Systems  CONSTITUTIONAL: NEGATIVE for fever, chills, change in weight  ENT/MOUTH: NEGATIVE for mouth and throat problems  RESP: NEGATIVE for significant cough or SOB  CV: NEGATIVE for chest pain, trouble breathing      Patient Active Problem List    Diagnosis Date Noted    Aphakia of right eye 12/20/2023     Priority: Medium    Iridodialysis of right eye 12/20/2023     Priority: Medium    Other age-related incipient cataract of both eyes 11/08/2023     Priority: Medium      Past Medical History:   Diagnosis Date    Arthritis     Hypertension      Past Surgical History:   Procedure Laterality Date    EXTRACAPSULAR CATARACT EXTRATION WITH INTRAOCULAR LENS IMPLANT Right 12/11/2023    Procedure: RIGHT COMPLICATED EXTRACAPSULAR CATARACT EXTRACTION WITHOUT LENS INSERTION;  Surgeon: Scot De La Paz MD;  Location: [facility] OR     Current Outpatient Medications   Medication Sig Dispense Refill    DIOVAN 160 MG tablet 160 mg daily      hydrochlorothiazide (HYDRODIURIL) 12.5 MG tablet TOME IVORY TABLETA POR LA BOCA CADA EULALIO      prednisoLONE acetate (PRED FORTE) 1 % ophthalmic suspension Place 1 drop into the right eye 3 times daily To operative eye 5 mL 1    terazosin (HYTRIN) 1 MG capsule 2 mg at bedtime      ketorolac (ACULAR) 0.5 % ophthalmic solution Place 1 drop into the right eye 4 times daily (Patient not taking: Reported on 1/10/2024) 5 mL 0    moxifloxacin (VIGAMOX) 0.5 % ophthalmic solution Place 1 drop into the right eye 3 times daily To operative eye (Patient not taking: Reported on 1/10/2024) 3 mL 0       Allergies   Allergen Reactions    Other Drug Allergy (See Comments) Swelling     Pt reports allergies to \"muscle relaxers and anti-pyretics\" but does not know the name. Reports facial swelling, tongue swelling. Patient reports he does ok with tylenol " "and ibuprofen.     Penicillins Swelling        Social History     Tobacco Use    Smoking status: Former     Types: Cigarettes    Smokeless tobacco: Never   Substance Use Topics    Alcohol use: Not on file       History   Drug Use Not on file         Objective     /74 (BP Location: Right arm, Patient Position: Sitting, Cuff Size: Adult Regular)   Pulse 83   Temp 97.4  F (36.3  C) (Tympanic)   Resp 16   Ht 1.697 m (5' 6.81\")   Wt 73.5 kg (162 lb)   SpO2 96%   BMI 25.52 kg/m      Physical Exam  GENERAL APPEARANCE: healthy, alert and no distress  HENT: ear canals and TM's normal and nose and mouth without ulcers or lesions  RESP: lungs clear to auscultation - no rales, rhonchi or wheezes  CV: regular rate and rhythm, normal S1 S2, no S3 or S4 and no murmur, click or rub   ABDOMEN: soft, nontender, no HSM or masses and bowel sounds normal  NEURO: Normal strength and tone, sensory exam grossly normal, mentation intact and speech normal    No results for input(s): \"HGB\", \"PLT\", \"INR\", \"NA\", \"POTASSIUM\", \"CR\", \"A1C\" in the last 87856 hours.     Diagnostics:  No labs were ordered during this visit.   No EKG required for low risk surgery (cataract, skin procedure, breast biopsy, etc).    Revised Cardiac Risk Index (RCRI):  The patient has the following serious cardiovascular risks for perioperative complications:   - No serious cardiac risks = 0 points     RCRI Interpretation: 0 points: Class I (very low risk - 0.4% complication rate)         Signed Electronically by: Ambrosio Betancourt MD  Copy of this evaluation report is provided to requesting physician.    I have seen the patient, discussed with the resident and agree with the history, physical and plan as documented above.    Annabelle Rush MD  Internal Medicine - Pediatrics        "

## 2024-01-10 NOTE — PATIENT INSTRUCTIONS
Please do not take any ibuprofen the day prior to surgery or the day of.  Take your blood pressure medications the day prior to surgery and then you can take them after surgery  Follow up with our clinic after surgery for an annual wellness visit    Por favor, no tome ibuprofeno el día anterior a la cirugía o el día de la misma.  Glenarden rivera medicamentos para la presión arterial el día antes de la cirugía y luego puede tomarlos después de la cirugía  Anibal un seguimiento con nuestra clínica después de la cirugía para kathi visita anual de bienestar

## 2024-01-12 ENCOUNTER — ANESTHESIA EVENT (OUTPATIENT)
Dept: SURGERY | Facility: AMBULATORY SURGERY CENTER | Age: 79
End: 2024-01-12
Payer: COMMERCIAL

## 2024-01-12 DIAGNOSIS — H27.01 APHAKIA OF RIGHT EYE: ICD-10-CM

## 2024-01-12 DIAGNOSIS — H21.531 IRIDODIALYSIS OF RIGHT EYE: Primary | ICD-10-CM

## 2024-01-12 RX ORDER — ONDANSETRON 4 MG/1
4 TABLET, ORALLY DISINTEGRATING ORAL EVERY 30 MIN PRN
Status: CANCELLED | OUTPATIENT
Start: 2024-01-12

## 2024-01-12 RX ORDER — ONDANSETRON 2 MG/ML
4 INJECTION INTRAMUSCULAR; INTRAVENOUS EVERY 30 MIN PRN
Status: CANCELLED | OUTPATIENT
Start: 2024-01-12

## 2024-01-12 RX ORDER — OXYCODONE HYDROCHLORIDE 5 MG/1
10 TABLET ORAL
Status: CANCELLED | OUTPATIENT
Start: 2024-01-12

## 2024-01-15 ENCOUNTER — HOSPITAL ENCOUNTER (OUTPATIENT)
Facility: AMBULATORY SURGERY CENTER | Age: 79
Discharge: HOME | End: 2024-01-15
Attending: OPHTHALMOLOGY
Payer: COMMERCIAL

## 2024-01-15 ENCOUNTER — ANESTHESIA (OUTPATIENT)
Dept: SURGERY | Facility: AMBULATORY SURGERY CENTER | Age: 79
End: 2024-01-15
Payer: COMMERCIAL

## 2024-01-15 VITALS
OXYGEN SATURATION: 95 % | TEMPERATURE: 96.8 F | WEIGHT: 165.34 LBS | HEIGHT: 66 IN | BODY MASS INDEX: 26.57 KG/M2 | RESPIRATION RATE: 16 BRPM | DIASTOLIC BLOOD PRESSURE: 76 MMHG | SYSTOLIC BLOOD PRESSURE: 122 MMHG | HEART RATE: 65 BPM

## 2024-01-15 DIAGNOSIS — H21.531 IRIDODIALYSIS OF RIGHT EYE: Primary | ICD-10-CM

## 2024-01-15 DIAGNOSIS — H27.01 APHAKIA OF RIGHT EYE: ICD-10-CM

## 2024-01-15 PROCEDURE — 66985 INSERT LENS PROSTHESIS: CPT | Mod: RT

## 2024-01-15 DEVICE — 3-PIECE, MONOFOCAL, HYDROPHOBIC, ACRYLIC, INTRAOCULAR LENS, +23.5D
Type: IMPLANTABLE DEVICE | Site: EYE | Status: FUNCTIONAL
Brand: CT LUCIA

## 2024-01-15 RX ORDER — PREDNISOLONE ACETATE 10 MG/ML
1 SUSPENSION/ DROPS OPHTHALMIC 4 TIMES DAILY
Qty: 5 ML | Refills: 0 | Status: SHIPPED | OUTPATIENT
Start: 2024-01-15

## 2024-01-15 RX ORDER — PROPARACAINE HYDROCHLORIDE 5 MG/ML
1 SOLUTION/ DROPS OPHTHALMIC ONCE
Status: DISCONTINUED | OUTPATIENT
Start: 2024-01-15 | End: 2024-01-15 | Stop reason: HOSPADM

## 2024-01-15 RX ORDER — PROPOFOL 10 MG/ML
INJECTION, EMULSION INTRAVENOUS CONTINUOUS PRN
Status: DISCONTINUED | OUTPATIENT
Start: 2024-01-15 | End: 2024-01-15

## 2024-01-15 RX ORDER — KETOROLAC TROMETHAMINE 5 MG/ML
1 SOLUTION OPHTHALMIC 4 TIMES DAILY
Qty: 5 ML | Refills: 1 | Status: SHIPPED | OUTPATIENT
Start: 2024-01-15

## 2024-01-15 RX ORDER — MOXIFLOXACIN 5 MG/ML
1 SOLUTION/ DROPS OPHTHALMIC 4 TIMES DAILY
Qty: 3 ML | Refills: 0 | Status: SHIPPED | OUTPATIENT
Start: 2024-01-15

## 2024-01-15 RX ORDER — PROPARACAINE HYDROCHLORIDE 5 MG/ML
1 SOLUTION/ DROPS OPHTHALMIC ONCE
Status: COMPLETED | OUTPATIENT
Start: 2024-01-15 | End: 2024-01-15

## 2024-01-15 RX ORDER — HYDROMORPHONE HYDROCHLORIDE 1 MG/ML
0.2 INJECTION, SOLUTION INTRAMUSCULAR; INTRAVENOUS; SUBCUTANEOUS EVERY 5 MIN PRN
Status: DISCONTINUED | OUTPATIENT
Start: 2024-01-15 | End: 2024-01-16 | Stop reason: HOSPADM

## 2024-01-15 RX ORDER — DEXAMETHASONE SODIUM PHOSPHATE 4 MG/ML
INJECTION, SOLUTION INTRA-ARTICULAR; INTRALESIONAL; INTRAMUSCULAR; INTRAVENOUS; SOFT TISSUE PRN
Status: DISCONTINUED | OUTPATIENT
Start: 2024-01-15 | End: 2024-01-15

## 2024-01-15 RX ORDER — CYCLOPENTOLAT/TROPIC/PHENYLEPH 1%-1%-2.5%
1 DROPS (EA) OPHTHALMIC (EYE)
Status: DISCONTINUED | OUTPATIENT
Start: 2024-01-15 | End: 2024-01-15 | Stop reason: HOSPADM

## 2024-01-15 RX ORDER — ONDANSETRON 2 MG/ML
INJECTION INTRAMUSCULAR; INTRAVENOUS PRN
Status: DISCONTINUED | OUTPATIENT
Start: 2024-01-15 | End: 2024-01-15

## 2024-01-15 RX ORDER — FENTANYL CITRATE 50 UG/ML
25 INJECTION, SOLUTION INTRAMUSCULAR; INTRAVENOUS EVERY 5 MIN PRN
Status: DISCONTINUED | OUTPATIENT
Start: 2024-01-15 | End: 2024-01-16 | Stop reason: HOSPADM

## 2024-01-15 RX ORDER — FENTANYL CITRATE 50 UG/ML
INJECTION, SOLUTION INTRAMUSCULAR; INTRAVENOUS PRN
Status: DISCONTINUED | OUTPATIENT
Start: 2024-01-15 | End: 2024-01-15

## 2024-01-15 RX ORDER — OXYCODONE HYDROCHLORIDE 5 MG/1
5 TABLET ORAL
Status: DISCONTINUED | OUTPATIENT
Start: 2024-01-15 | End: 2024-01-16 | Stop reason: HOSPADM

## 2024-01-15 RX ORDER — SODIUM CHLORIDE, SODIUM LACTATE, POTASSIUM CHLORIDE, CALCIUM CHLORIDE 600; 310; 30; 20 MG/100ML; MG/100ML; MG/100ML; MG/100ML
INJECTION, SOLUTION INTRAVENOUS CONTINUOUS
Status: DISCONTINUED | OUTPATIENT
Start: 2024-01-15 | End: 2024-01-16 | Stop reason: HOSPADM

## 2024-01-15 RX ORDER — GLYCOPYRROLATE 0.2 MG/ML
INJECTION, SOLUTION INTRAMUSCULAR; INTRAVENOUS PRN
Status: DISCONTINUED | OUTPATIENT
Start: 2024-01-15 | End: 2024-01-15

## 2024-01-15 RX ORDER — ONDANSETRON 4 MG/1
4 TABLET, ORALLY DISINTEGRATING ORAL EVERY 30 MIN PRN
Status: DISCONTINUED | OUTPATIENT
Start: 2024-01-15 | End: 2024-01-16 | Stop reason: HOSPADM

## 2024-01-15 RX ORDER — PROPOFOL 10 MG/ML
INJECTION, EMULSION INTRAVENOUS PRN
Status: DISCONTINUED | OUTPATIENT
Start: 2024-01-15 | End: 2024-01-15

## 2024-01-15 RX ORDER — MOXIFLOXACIN IN NACL,ISO-OS/PF 0.3MG/0.3
SYRINGE (ML) INTRAOCULAR PRN
Status: DISCONTINUED | OUTPATIENT
Start: 2024-01-15 | End: 2024-01-15 | Stop reason: HOSPADM

## 2024-01-15 RX ORDER — BALANCED SALT SOLUTION 6.4; .75; .48; .3; 3.9; 1.7 MG/ML; MG/ML; MG/ML; MG/ML; MG/ML; MG/ML
SOLUTION OPHTHALMIC PRN
Status: DISCONTINUED | OUTPATIENT
Start: 2024-01-15 | End: 2024-01-15 | Stop reason: HOSPADM

## 2024-01-15 RX ORDER — CYCLOPENTOLAT/TROPIC/PHENYLEPH 1%-1%-2.5%
1 DROPS (EA) OPHTHALMIC (EYE)
Status: COMPLETED | OUTPATIENT
Start: 2024-01-15 | End: 2024-01-15

## 2024-01-15 RX ORDER — LIDOCAINE 40 MG/G
CREAM TOPICAL
Status: DISCONTINUED | OUTPATIENT
Start: 2024-01-15 | End: 2024-01-15 | Stop reason: HOSPADM

## 2024-01-15 RX ORDER — ACETAMINOPHEN 325 MG/1
975 TABLET ORAL ONCE
Status: COMPLETED | OUTPATIENT
Start: 2024-01-15 | End: 2024-01-15

## 2024-01-15 RX ORDER — HYDROMORPHONE HYDROCHLORIDE 1 MG/ML
0.4 INJECTION, SOLUTION INTRAMUSCULAR; INTRAVENOUS; SUBCUTANEOUS EVERY 5 MIN PRN
Status: DISCONTINUED | OUTPATIENT
Start: 2024-01-15 | End: 2024-01-16 | Stop reason: HOSPADM

## 2024-01-15 RX ORDER — LIDOCAINE HYDROCHLORIDE 20 MG/ML
INJECTION, SOLUTION INFILTRATION; PERINEURAL PRN
Status: DISCONTINUED | OUTPATIENT
Start: 2024-01-15 | End: 2024-01-15

## 2024-01-15 RX ORDER — ONDANSETRON 2 MG/ML
4 INJECTION INTRAMUSCULAR; INTRAVENOUS EVERY 30 MIN PRN
Status: DISCONTINUED | OUTPATIENT
Start: 2024-01-15 | End: 2024-01-16 | Stop reason: HOSPADM

## 2024-01-15 RX ORDER — FENTANYL CITRATE 50 UG/ML
50 INJECTION, SOLUTION INTRAMUSCULAR; INTRAVENOUS EVERY 5 MIN PRN
Status: DISCONTINUED | OUTPATIENT
Start: 2024-01-15 | End: 2024-01-16 | Stop reason: HOSPADM

## 2024-01-15 RX ORDER — SODIUM CHLORIDE, SODIUM LACTATE, POTASSIUM CHLORIDE, CALCIUM CHLORIDE 600; 310; 30; 20 MG/100ML; MG/100ML; MG/100ML; MG/100ML
INJECTION, SOLUTION INTRAVENOUS CONTINUOUS
Status: DISCONTINUED | OUTPATIENT
Start: 2024-01-15 | End: 2024-01-15 | Stop reason: HOSPADM

## 2024-01-15 RX ADMIN — Medication 100 MCG: at 08:18

## 2024-01-15 RX ADMIN — Medication 100 MCG: at 08:14

## 2024-01-15 RX ADMIN — Medication 100 MCG: at 07:46

## 2024-01-15 RX ADMIN — Medication 200 MCG: at 07:58

## 2024-01-15 RX ADMIN — SODIUM CHLORIDE, SODIUM LACTATE, POTASSIUM CHLORIDE, CALCIUM CHLORIDE: 600; 310; 30; 20 INJECTION, SOLUTION INTRAVENOUS at 06:55

## 2024-01-15 RX ADMIN — Medication 100 MCG: at 07:50

## 2024-01-15 RX ADMIN — SODIUM CHLORIDE, SODIUM LACTATE, POTASSIUM CHLORIDE, CALCIUM CHLORIDE: 600; 310; 30; 20 INJECTION, SOLUTION INTRAVENOUS at 07:22

## 2024-01-15 RX ADMIN — FENTANYL CITRATE 25 MCG: 50 INJECTION, SOLUTION INTRAMUSCULAR; INTRAVENOUS at 09:08

## 2024-01-15 RX ADMIN — Medication 1 DROP: at 06:57

## 2024-01-15 RX ADMIN — FENTANYL CITRATE 50 MCG: 50 INJECTION, SOLUTION INTRAMUSCULAR; INTRAVENOUS at 07:27

## 2024-01-15 RX ADMIN — Medication 100 MCG: at 07:38

## 2024-01-15 RX ADMIN — Medication 100 MCG: at 07:52

## 2024-01-15 RX ADMIN — PROPARACAINE HYDROCHLORIDE 1 DROP: 5 SOLUTION/ DROPS OPHTHALMIC at 06:46

## 2024-01-15 RX ADMIN — Medication 100 MCG: at 08:05

## 2024-01-15 RX ADMIN — GLYCOPYRROLATE 0.2 MG: 0.2 INJECTION, SOLUTION INTRAMUSCULAR; INTRAVENOUS at 07:35

## 2024-01-15 RX ADMIN — LIDOCAINE HYDROCHLORIDE 60 MG: 20 INJECTION, SOLUTION INFILTRATION; PERINEURAL at 07:27

## 2024-01-15 RX ADMIN — PROPOFOL 150 MCG/KG/MIN: 10 INJECTION, EMULSION INTRAVENOUS at 07:27

## 2024-01-15 RX ADMIN — DEXAMETHASONE SODIUM PHOSPHATE 4 MG: 4 INJECTION, SOLUTION INTRA-ARTICULAR; INTRALESIONAL; INTRAMUSCULAR; INTRAVENOUS; SOFT TISSUE at 07:35

## 2024-01-15 RX ADMIN — Medication 100 MCG: at 07:42

## 2024-01-15 RX ADMIN — ONDANSETRON 4 MG: 2 INJECTION INTRAMUSCULAR; INTRAVENOUS at 07:35

## 2024-01-15 RX ADMIN — FENTANYL CITRATE 50 MCG: 50 INJECTION, SOLUTION INTRAMUSCULAR; INTRAVENOUS at 09:15

## 2024-01-15 RX ADMIN — Medication 1 DROP: at 06:47

## 2024-01-15 RX ADMIN — Medication 100 MCG: at 08:01

## 2024-01-15 RX ADMIN — Medication 100 MCG: at 07:36

## 2024-01-15 RX ADMIN — PROPOFOL 200 MG: 10 INJECTION, EMULSION INTRAVENOUS at 07:27

## 2024-01-15 RX ADMIN — Medication 1 DROP: at 07:05

## 2024-01-15 RX ADMIN — Medication 100 MCG: at 07:32

## 2024-01-15 RX ADMIN — Medication 100 MCG: at 07:49

## 2024-01-15 NOTE — BRIEF OP NOTE
[facility]    Brief Operative Note    Pre-operative diagnosis: Aphakia of right eye [H27.01]  Iridodialysis of right eye [H21.531]  Post-operative diagnosis Same as pre-operative diagnosis    Procedure: RIGHT EYE INSERTION, INTRAOCULAR LENS IMPLANT, SECONDARY, Right - Eye  IRIDOPLASTY RIGHT EYE, Right - Eye    Surgeon: Surgeon(s) and Role:     * Scot De La Paz MD - Primary     * Janet Day MD - Resident - Assisting     * Gian Kapoor MD - Fellow - Assisting  Anesthesia: General   Estimated Blood Loss: Minimal    Drains: None  Specimens: * No specimens in log *  Findings:   As expected .  Complications: None.  Implants:   Implant Name Type Inv. Item Serial No.  Lot No. LRB No. Used Action   [employer] CT Dodie 602 23.5   [id number]   Right 1 Implanted           Janet Day MD  Resident Physician, PGY-3  Department of Ophthalmology

## 2024-01-15 NOTE — OP NOTE
SURGEON: Scot Corral MD   ASSISTANT SURGEON: Gian Diaz MD    PREOPERATIVE DIAGNOSIS: aphakia with irido dialysis right eye     POSTOPERATIVE DIAGNOSIS: same    PROCEDURE: secondary intraocular lens implantation and iridoplasty right eye     ANESTHESIA: general anesthesia   COMPLICATIONS: none    Indication: Zeferino Odell is a 78 year old patient with diagnosis of aphakia (surgically induced) and iridodialysis right eye     DESCRIPTION OF THE PROCEDURE:  The patient was taken to the operative room where monitored general anesthesia were given     The operative eye was prepped and draped in the usual sterile surgical fashion for ophthalmic surgery, including the installation of one drop of 5% Povidone Iodine.  A sterile drape was placed over the face and body and a lid speculum was inserted.      With the use of a Supersharp blade , a paracentesis was created at the limbus temporally, infeonasally and superonasally. Anterior chamber was filled with viscoelastic. Iris strands were released from the temporal limbus incision scar. Double armed [id number] prolene suture was used to place two McCollen sutures at superotemporal and inferotemporal to bring iris base to its position and suture it to the anterior scleral wall ~2 mm behind the limbus. A localized peritomy was performed so suture knots were placed under the conjunctiva. Suture ends were trimmed long.    Viscoelastic was injected into the anterior chamber using a canula.  A 2.4 mm keratome was then used to construct a clear corneal incision at the 10 o'clock position.  Pupil looked better with only a small apparent peripheral iridectomy inferotemporally, however, temporal pupil still looked dilated and non reactive due to iris atrophy. It was noted that no significant lens material was left in the eye. I attempted to re-inflate the capsular bag with viscoelastic to place an in the bag IOL but adhesion between ant and post capsule was too strong. Then a  superior limbal incision was made and a 3-piece IOL was (below) injected into the ciliary sulcus and was rotated into a good position with a Sinskey hook. The remaining elements of viscoelastic were then removed with irrigation/aspiration. The wounds were hydrated and a 10-0 nylon suture was placed. Other paracentesis wounds were hydrated and were watertight. An air bubble was placed in the AC. Conjunctiva was sutured with 10-0 nylon suture that will be removed in 1-2 days. intracameral moxifloxacin and subconjunctival block was injected.     The lid speculum was remove. The eye was cleaned with wet and dry gauze. A clear shield were placed over the eye.  The patient was discharge in stable condition having tolerated the procedure well.    Implant Name Type Inv. Item Serial No.  Lot No. LRB No. Used [facility] CT Dodie 602 23.5   [id number]   Right 1 Implanted

## 2024-01-15 NOTE — ANESTHESIA PROCEDURE NOTES
Airway       Patient location during procedure: OR  Staff -        Anesthesiologist:  Christiano Alegria MD       CRNA: Brooke Camacho APRN CRNA       Performed By: CRNAIndications and Patient Condition       Indications for airway management: tonia-procedural       Induction type:intravenous       Mask difficulty assessment: 1 - vent by mask    Final Airway Details       Final airway type: supraglottic airway    Supraglottic Airway Details        Type: LMA       Brand: LMA Unique       LMA size: 5    Post intubation assessment        Placement verified by: capnometry, equal breath sounds and chest rise        Number of attempts at approach: 1       Number of other approaches attempted: 0       Secured with: tape       Ease of procedure: easy       Dentition: Intact and Unchanged        
No

## 2024-01-15 NOTE — ANESTHESIA POSTPROCEDURE EVALUATION
Patient: Zeferino Odell    Procedure: Procedure(s):  RIGHT EYE INSERTION, INTRAOCULAR LENS IMPLANT, SECONDARY  IRIDOPLASTY RIGHT EYE       Anesthesia Type:  General    Note:  Disposition: Outpatient   Postop Pain Control: Uneventful            Sign Out: Well controlled pain   PONV: No   Neuro/Psych: Uneventful            Sign Out: Acceptable/Baseline neuro status   Airway/Respiratory: Uneventful            Sign Out: Acceptable/Baseline resp. status   CV/Hemodynamics: Uneventful            Sign Out: Acceptable CV status; No obvious hypovolemia; No obvious fluid overload   Other NRE: NONE   DID A NON-ROUTINE EVENT OCCUR? No           Last vitals:  Vitals Value Taken Time   /78 01/15/24 0921   Temp 36  C (96.8  F) 01/15/24 0921   Pulse 62 01/15/24 0921   Resp 7 01/15/24 0921   SpO2 97 % 01/15/24 0924   Vitals shown include unfiled device data.    Electronically Signed By: Christiano Alegria MD  January 15, 2024  10:06 AM
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
## 2024-01-15 NOTE — DISCHARGE INSTRUCTIONS
[facility]  Home Care Following Anesthesia  For 24 hours after surgery:  Get plenty of rest.  A responsible adult must stay with you for at least 24 hours after you leave the surgery center.  Do not drive or use heavy equipment.  If you have weakness or tingling, don't drive or use heavy equipment until this feeling goes away.   Do not drink alcohol.   Avoid strenuous or risky activities.  Ask for help when climbing stairs.  You may feel lightheaded.  IF so, sit for a few minutes before standing.  Have someone help you get up.   If you have nausea (feel sick to your stomach): Drink only clear liquids such as apple juice, ginger ale, broth or 7-Up.  Rest may also help.  Be sure to drink enough fluids.  Move to a regular diet as you feel able.   You may have a slight fever.  Call the doctor if your fever is over 100 F (37.7 C) (taken under the tongue) or lasts longer than 24 hours.  You may have a dry mouth, a sore throat, muscle aches or trouble sleeping. These should go away after 24 hours.  Do not make important or legal decisions.   It is recommended to avoid smoking.               Tips for taking pain medications  To get the best pain relief possible, remember these points:  Take pain medications as directed, before pain becomes severe.  Pain medication can upset your stomach: taking it with food may help.  Constipation is a common side effect of pain medication. Drink plenty of  fluids.  Eat foods high in fiber. Take a stool softener if recommended by your doctor or pharmacist.  Do not drink alcohol, drive or operate machinery while taking pain medications.  Ask about other ways to control pain, such as with heat, ice or relaxation.    Tylenol/Acetaminophen Consumption    If you feel your pain relief is insufficient, you may take Tylenol/Acetaminophen in addition to your narcotic pain medication.   Be careful not to exceed 4,000 mg of Tylenol/Acetaminophen in a 24 hour  period from all sources.  If you are taking extra strength Tylenol/acetaminophen (500 mg), the maximum dose is 8 tablets in 24 hours.  If you are taking regular strength acetaminophen (325 mg), the maximum dose is 12 tablets in 24 hours.    Call a doctor for any of the following:  Signs of infection (fever, growing tenderness at the surgery site, a large amount of drainage or bleeding, severe pain, foul-smelling drainage, redness, swelling).  It has been over 8 to 10 hours since surgery and you are still not able to urinate (pass water).  Headache for over 24 hours.  Numbness, tingling or weakness the day after surgery (if you had spinal anesthesia).  Signs of Covid-19 infection (temperature over 100 degrees, shortness of breath, cough, loss of taste/smell, generalized body aches, persistent headache, chills, sore throat, nausea/vomiting/diarrhea)  Your doctor is:       Dr. Scot Gonzalez, Ophthalmology: [phone]               Or dial [phone] and ask for the resident on call for:  Ophthalmology  For emergency care, call the:  [place] [facility]:  [phone] (TTY for hearing impaired: [phone])

## 2024-01-15 NOTE — ANESTHESIA CARE TRANSFER NOTE
Patient: Zeferino Odell    Procedure: Procedure(s):  RIGHT EYE INSERTION, INTRAOCULAR LENS IMPLANT, SECONDARY  IRIDOPLASTY RIGHT EYE       Diagnosis: Aphakia of right eye [H27.01]  Iridodialysis of right eye [H21.531]  Diagnosis Additional Information: No value filed.    Anesthesia Type:   General     Note:    Oropharynx: oropharynx clear of all foreign objects  Level of Consciousness: awake  Oxygen Supplementation: face mask    Independent Airway: airway patency satisfactory and stable  Dentition: dentition unchanged  Vital Signs Stable: post-procedure vital signs reviewed and stable    Patient transferred to: PACU    Handoff Report: Identifed the Patient, Identified the Reponsible Provider, Reviewed the pertinent medical history, Discussed the surgical course, Reviewed Intra-OP anesthesia mangement and issues during anesthesia, Set expectations for post-procedure period and Allowed opportunity for questions and acknowledgement of understanding      Vitals:  Vitals Value Taken Time   /92 01/15/24 0851   Temp     Pulse 65 01/15/24 0854   Resp 10 01/15/24 0854   SpO2 99 % 01/15/24 0854   Vitals shown include unfiled device data.    Electronically Signed By: CINDY Portillo CRNA  January 15, 2024  8:55 AM

## 2024-01-16 ENCOUNTER — OFFICE VISIT (OUTPATIENT)
Dept: OPHTHALMOLOGY | Facility: CLINIC | Age: 79
End: 2024-01-16
Attending: OPHTHALMOLOGY
Payer: COMMERCIAL

## 2024-01-16 DIAGNOSIS — Z48.810 AFTERCARE FOLLOWING SURGERY OF A SENSE ORGAN: Primary | ICD-10-CM

## 2024-01-16 DIAGNOSIS — H25.12 NUCLEAR SENILE CATARACT OF LEFT EYE: ICD-10-CM

## 2024-01-16 PROCEDURE — 99024 POSTOP FOLLOW-UP VISIT: CPT | Mod: GC | Performed by: OPHTHALMOLOGY

## 2024-01-16 PROCEDURE — G0463 HOSPITAL OUTPT CLINIC VISIT: HCPCS | Performed by: OPHTHALMOLOGY

## 2024-01-16 ASSESSMENT — VISUAL ACUITY
OD_PH_SC: 20/50
OD_SC+: +2
OD_SC: 20/70
METHOD: SNELLEN - LINEAR
OS_SC: 20/60

## 2024-01-16 ASSESSMENT — SLIT LAMP EXAM - LIDS
COMMENTS: NORMAL
COMMENTS: MILD EDEMA; NO TENDERNESS

## 2024-01-16 ASSESSMENT — TONOMETRY
IOP_METHOD: ICARE
OS_IOP_MMHG: 10
OD_IOP_MMHG: 08

## 2024-01-16 ASSESSMENT — EXTERNAL EXAM - RIGHT EYE: OD_EXAM: NORMAL

## 2024-01-16 NOTE — PROGRESS NOTES
S/P complex cataract extraction with iatrogenic iridodialysis right eye 12/11/23  S/P Secondary IOL and iridoplasty 1/15/24    Superficial conj sutures removed  Moxifloxacin 4/day right eye   Prednisolone (white or pink top) 4/day right eye  Ketorolac 4/day right eye      Left eye cataract NS 2+  Plan for CE IOL under [place] after right eye is taken care of (maybe for 1/29/24)  Risks of surgery including but not limited to infection (rare but a devastating complication that will need intraocular antibiotics injection and possibly more surgeries), risk of retinal detachment, dropped nuclear pieces or dropped intraocular lens, glaucoma may need further medications, corneal edema that may need a long course of medication or even surgery were discussed  In length with the patient. We discussed about the benefits of CE IOL surgery and its alternatives. All the questions answered. Zeferino agreed and wanted to proceed.   Case request placed  Faculty case      Retinoschisis ou  Appears stable compared to previous optos images; in OCT, there is a subretinal fluid component left eye but doesn't seem to be RRD    Pigmented retinal lesion left eye   Seen by Dr. Dominique Day MD  Resident Physician, PGY-3  Department of Ophthalmology        Complete documentation of historical and exam elements from today's encounter can be found in the full encounter summary report (not reduplicated in this progress note). I personally obtained the chief complaint(s) and history of present illness.  I confirmed and edited as necessary the review of systems, past medical/surgical history, family history, social history, and examination findings as documented by others; and I examined the patient myself. I personally reviewed the relevant tests, images, and reports as documented above. I formulated and edited as necessary the assessment and plan and discussed the findings and management plan with the patient and family.    Scot  MD Ellis  , Vitreoretinal surgery   Department of Ophthalmology  [facility]

## 2024-01-16 NOTE — NURSING NOTE
Chief Complaints and History of Present Illnesses   Patient presents with    Post Op (Ophthalmology) Right Eye     One day POP for insertion of IOL right eye.       Chief Complaint(s) and History of Present Illness(es)       Post Op (Ophthalmology) Right Eye              Comments: One day POP for insertion of IOL right eye.                Comments    The patient notes he has a small amount of right eye discomfort.  He notes he slept well.  Lizeth Cruz, COA, COA 11:00 AM 01/16/2024

## 2024-01-17 ENCOUNTER — TELEPHONE (OUTPATIENT)
Dept: OPHTHALMOLOGY | Facility: CLINIC | Age: 79
End: 2024-01-17
Payer: COMMERCIAL

## 2024-01-17 PROBLEM — H25.12 NUCLEAR SENILE CATARACT OF LEFT EYE: Status: ACTIVE | Noted: 2024-01-16

## 2024-01-17 NOTE — TELEPHONE ENCOUNTER
Patient is schedule for surgery with: Dr. Corral    Surgery Date: 1/29     Location: [facility]    H&P: already completed by Primary Care team - patient instructed to schedule. Patient stated this will be schedule with completed on 1/10     Post-op:  1/30, 2/7, in-person visit, with Dr Corral    Patient will receive a phone call from pre-admission nurses 1-2 days prior to surgery with arrival time and NPO instructions.    Patient aware times are subject to change up until day before surgery.     Patient questions/concerns: N/A     Surgery packet was sent via US mail 1/18 in Greek      Felicita Omalley on 1/17/2024 at 3:14 PM

## 2024-01-23 ENCOUNTER — OFFICE VISIT (OUTPATIENT)
Dept: OPHTHALMOLOGY | Facility: CLINIC | Age: 79
End: 2024-01-23
Attending: OPHTHALMOLOGY
Payer: COMMERCIAL

## 2024-01-23 DIAGNOSIS — Z48.810 AFTERCARE FOLLOWING SURGERY OF A SENSE ORGAN: Primary | ICD-10-CM

## 2024-01-23 PROCEDURE — G0463 HOSPITAL OUTPT CLINIC VISIT: HCPCS | Performed by: OPHTHALMOLOGY

## 2024-01-23 PROCEDURE — 99024 POSTOP FOLLOW-UP VISIT: CPT | Performed by: OPHTHALMOLOGY

## 2024-01-23 ASSESSMENT — VISUAL ACUITY
OS_SC+: +1
OS_PH_SC: 20/40
OS_SC: 20/70
METHOD: SNELLEN - LINEAR
OD_SC: 20/40

## 2024-01-23 ASSESSMENT — TONOMETRY
IOP_METHOD: TONOPEN
OS_IOP_MMHG: 09
OD_IOP_MMHG: 11

## 2024-01-23 ASSESSMENT — SLIT LAMP EXAM - LIDS
COMMENTS: NORMAL
COMMENTS: MILD EDEMA; NO TENDERNESS

## 2024-01-23 ASSESSMENT — EXTERNAL EXAM - RIGHT EYE: OD_EXAM: NORMAL

## 2024-01-23 NOTE — NURSING NOTE
Chief Complaints and History of Present Illnesses   Patient presents with    Post Op (Ophthalmology) Right Eye     Pop Right Eye S/P complex cataract extraction with iatrogenic iridodialysis right eye 12/11/23  S/P Secondary IOL and iridoplasty 1/15/24.       Chief Complaint(s) and History of Present Illness(es)       Post Op (Ophthalmology) Right Eye              Comments: Pop Right Eye S/P complex cataract extraction with iatrogenic iridodialysis right eye 12/11/23  S/P Secondary IOL and iridoplasty 1/15/24.                Comments    The patient notes his right eye vision is blurry.  He has no eye pain.   The patient is uisng Moxifloxacin, Prednisolone and Ketorolac four times daily.  Lizeth Cruz, COA, COA 10:42 AM 01/23/2024

## 2024-01-23 NOTE — PATIENT INSTRUCTIONS
-Stop Moxifloxacin 4/day right eye     -Continue Prednisolone (white or pink top) 3/day right eye    -Continue Ketorolac (gray top) 3/day right eye

## 2024-01-23 NOTE — PROGRESS NOTES
S/P complex cataract extraction with iatrogenic iridodialysis right eye 12/11/23  S/P Secondary IOL and iridoplasty 1/15/24    Superficial conj sutures removed  Stop Moxifloxacin 4/day right eye   Continue Prednisolone (white or pink top) 3/day right eye  Continue Ketorolac (gray top) 3/day right eye      Left eye cataract NS 2+  Continue to plan for CE IOL under [place] after right eye is taken care of (maybe for 1/29/24)  Risks of surgery including but not limited to infection (rare but a devastating complication that will need intraocular antibiotics injection and possibly more surgeries), risk of retinal detachment, dropped nuclear pieces or dropped intraocular lens, glaucoma may need further medications, corneal edema that may need a long course of medication or even surgery were discussed  In length with the patient. We discussed about the benefits of CE IOL surgery and its alternatives. All the questions answered. Zeferino agreed and wanted to proceed.   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 Fox      Complete documentation of historical and exam elements from today's encounter can be found in the full encounter summary report (not reduplicated in this progress note). I personally obtained the chief complaint(s) and history of present illness.  I confirmed and edited as necessary the review of systems, past medical/surgical history, family history, social history, and examination findings as documented by others; and I examined the patient myself. I personally reviewed the relevant tests, images, and reports as documented above. I formulated and edited as necessary the assessment and plan and discussed the findings and management plan with the patient and family.    Scot Corral MD  ,  Vitreoretinal surgery   Department of Ophthalmology  [facility]

## 2024-01-26 ENCOUNTER — ANESTHESIA EVENT (OUTPATIENT)
Dept: SURGERY | Facility: AMBULATORY SURGERY CENTER | Age: 79
End: 2024-01-26
Payer: COMMERCIAL

## 2024-01-29 ENCOUNTER — HOSPITAL ENCOUNTER (OUTPATIENT)
Facility: AMBULATORY SURGERY CENTER | Age: 79
Discharge: HOME | End: 2024-01-29
Attending: OPHTHALMOLOGY
Payer: COMMERCIAL

## 2024-01-29 ENCOUNTER — ANESTHESIA (OUTPATIENT)
Dept: SURGERY | Facility: AMBULATORY SURGERY CENTER | Age: 79
End: 2024-01-29
Payer: COMMERCIAL

## 2024-01-29 VITALS
WEIGHT: 163.14 LBS | SYSTOLIC BLOOD PRESSURE: 119 MMHG | TEMPERATURE: 97.1 F | OXYGEN SATURATION: 94 % | BODY MASS INDEX: 24.16 KG/M2 | HEIGHT: 69 IN | HEART RATE: 53 BPM | DIASTOLIC BLOOD PRESSURE: 63 MMHG | RESPIRATION RATE: 14 BRPM

## 2024-01-29 DIAGNOSIS — H25.12 NUCLEAR SENILE CATARACT OF LEFT EYE: Primary | ICD-10-CM

## 2024-01-29 PROCEDURE — 66984 XCAPSL CTRC RMVL W/O ECP: CPT | Mod: LT

## 2024-01-29 PROCEDURE — 15851 REMOVAL SUTR/STAPLE REQ ANES: CPT | Mod: 59,RT

## 2024-01-29 PROCEDURE — 66984 XCAPSL CTRC RMVL W/O ECP: CPT | Mod: 79 | Performed by: OPHTHALMOLOGY

## 2024-01-29 PROCEDURE — 15851 REMOVAL SUTR/STAPLE REQ ANES: CPT | Mod: 78 | Performed by: OPHTHALMOLOGY

## 2024-01-29 DEVICE — LENS CC60WF 25.5 CLAREON UV ASPHERIC BICONVEX IOL: Type: IMPLANTABLE DEVICE | Site: EYE | Status: FUNCTIONAL

## 2024-01-29 RX ORDER — DEXAMETHASONE SODIUM PHOSPHATE 4 MG/ML
INJECTION, SOLUTION INTRA-ARTICULAR; INTRALESIONAL; INTRAMUSCULAR; INTRAVENOUS; SOFT TISSUE PRN
Status: DISCONTINUED | OUTPATIENT
Start: 2024-01-29 | End: 2024-01-29

## 2024-01-29 RX ORDER — PROPARACAINE HYDROCHLORIDE 5 MG/ML
1 SOLUTION/ DROPS OPHTHALMIC ONCE
Status: DISCONTINUED | OUTPATIENT
Start: 2024-01-29 | End: 2024-01-29 | Stop reason: HOSPADM

## 2024-01-29 RX ORDER — MOXIFLOXACIN 5 MG/ML
1 SOLUTION/ DROPS OPHTHALMIC 3 TIMES DAILY
Qty: 3 ML | Refills: 0 | Status: SHIPPED | OUTPATIENT
Start: 2024-01-29

## 2024-01-29 RX ORDER — ONDANSETRON 2 MG/ML
INJECTION INTRAMUSCULAR; INTRAVENOUS PRN
Status: DISCONTINUED | OUTPATIENT
Start: 2024-01-29 | End: 2024-01-29

## 2024-01-29 RX ORDER — ONDANSETRON 2 MG/ML
4 INJECTION INTRAMUSCULAR; INTRAVENOUS EVERY 30 MIN PRN
Status: DISCONTINUED | OUTPATIENT
Start: 2024-01-29 | End: 2024-01-31 | Stop reason: HOSPADM

## 2024-01-29 RX ORDER — ONDANSETRON 2 MG/ML
4 INJECTION INTRAMUSCULAR; INTRAVENOUS EVERY 30 MIN PRN
Status: DISCONTINUED | OUTPATIENT
Start: 2024-01-29 | End: 2024-01-29 | Stop reason: HOSPADM

## 2024-01-29 RX ORDER — LABETALOL HYDROCHLORIDE 5 MG/ML
10 INJECTION, SOLUTION INTRAVENOUS
Status: DISCONTINUED | OUTPATIENT
Start: 2024-01-29 | End: 2024-01-29 | Stop reason: HOSPADM

## 2024-01-29 RX ORDER — SODIUM CHLORIDE, SODIUM LACTATE, POTASSIUM CHLORIDE, CALCIUM CHLORIDE 600; 310; 30; 20 MG/100ML; MG/100ML; MG/100ML; MG/100ML
INJECTION, SOLUTION INTRAVENOUS CONTINUOUS
Status: DISCONTINUED | OUTPATIENT
Start: 2024-01-29 | End: 2024-01-31 | Stop reason: HOSPADM

## 2024-01-29 RX ORDER — LIDOCAINE HYDROCHLORIDE 20 MG/ML
INJECTION, SOLUTION INFILTRATION; PERINEURAL PRN
Status: DISCONTINUED | OUTPATIENT
Start: 2024-01-29 | End: 2024-01-29

## 2024-01-29 RX ORDER — DICLOFENAC SODIUM 1 MG/ML
1 SOLUTION/ DROPS OPHTHALMIC
Status: COMPLETED | OUTPATIENT
Start: 2024-01-29 | End: 2024-01-29

## 2024-01-29 RX ORDER — PREDNISOLONE ACETATE 10 MG/ML
1 SUSPENSION/ DROPS OPHTHALMIC 4 TIMES DAILY
Qty: 5 ML | Refills: 0 | Status: SHIPPED | OUTPATIENT
Start: 2024-01-29

## 2024-01-29 RX ORDER — PROPOFOL 10 MG/ML
INJECTION, EMULSION INTRAVENOUS PRN
Status: DISCONTINUED | OUTPATIENT
Start: 2024-01-29 | End: 2024-01-29

## 2024-01-29 RX ORDER — HYDRALAZINE HYDROCHLORIDE 20 MG/ML
2.5-5 INJECTION INTRAMUSCULAR; INTRAVENOUS EVERY 10 MIN PRN
Status: DISCONTINUED | OUTPATIENT
Start: 2024-01-29 | End: 2024-01-29 | Stop reason: HOSPADM

## 2024-01-29 RX ORDER — SODIUM CHLORIDE, SODIUM LACTATE, POTASSIUM CHLORIDE, CALCIUM CHLORIDE 600; 310; 30; 20 MG/100ML; MG/100ML; MG/100ML; MG/100ML
INJECTION, SOLUTION INTRAVENOUS CONTINUOUS
Status: DISCONTINUED | OUTPATIENT
Start: 2024-01-29 | End: 2024-01-29 | Stop reason: HOSPADM

## 2024-01-29 RX ORDER — MOXIFLOXACIN IN NACL,ISO-OS/PF 0.3MG/0.3
SYRINGE (ML) INTRAOCULAR PRN
Status: DISCONTINUED | OUTPATIENT
Start: 2024-01-29 | End: 2024-01-29 | Stop reason: HOSPADM

## 2024-01-29 RX ORDER — PROPARACAINE HYDROCHLORIDE 5 MG/ML
1 SOLUTION/ DROPS OPHTHALMIC ONCE
Status: COMPLETED | OUTPATIENT
Start: 2024-01-29 | End: 2024-01-29

## 2024-01-29 RX ORDER — SODIUM CHLORIDE, SODIUM LACTATE, POTASSIUM CHLORIDE, CALCIUM CHLORIDE 600; 310; 30; 20 MG/100ML; MG/100ML; MG/100ML; MG/100ML
INJECTION, SOLUTION INTRAVENOUS CONTINUOUS PRN
Status: DISCONTINUED | OUTPATIENT
Start: 2024-01-29 | End: 2024-01-29

## 2024-01-29 RX ORDER — KETOROLAC TROMETHAMINE 5 MG/ML
1 SOLUTION OPHTHALMIC 4 TIMES DAILY
Qty: 5 ML | Refills: 0 | Status: SHIPPED | OUTPATIENT
Start: 2024-01-29

## 2024-01-29 RX ORDER — LIDOCAINE 40 MG/G
CREAM TOPICAL
Status: DISCONTINUED | OUTPATIENT
Start: 2024-01-29 | End: 2024-01-29 | Stop reason: HOSPADM

## 2024-01-29 RX ORDER — MOXIFLOXACIN 5 MG/ML
1 SOLUTION/ DROPS OPHTHALMIC
Status: COMPLETED | OUTPATIENT
Start: 2024-01-29 | End: 2024-01-29

## 2024-01-29 RX ORDER — ONDANSETRON 4 MG/1
4 TABLET, ORALLY DISINTEGRATING ORAL EVERY 30 MIN PRN
Status: DISCONTINUED | OUTPATIENT
Start: 2024-01-29 | End: 2024-01-31 | Stop reason: HOSPADM

## 2024-01-29 RX ORDER — ONDANSETRON 4 MG/1
4 TABLET, ORALLY DISINTEGRATING ORAL EVERY 30 MIN PRN
Status: DISCONTINUED | OUTPATIENT
Start: 2024-01-29 | End: 2024-01-29 | Stop reason: HOSPADM

## 2024-01-29 RX ORDER — OXYCODONE HYDROCHLORIDE 5 MG/1
10 TABLET ORAL
Status: DISCONTINUED | OUTPATIENT
Start: 2024-01-29 | End: 2024-01-31 | Stop reason: HOSPADM

## 2024-01-29 RX ORDER — HYDROMORPHONE HYDROCHLORIDE 1 MG/ML
0.4 INJECTION, SOLUTION INTRAMUSCULAR; INTRAVENOUS; SUBCUTANEOUS EVERY 5 MIN PRN
Status: DISCONTINUED | OUTPATIENT
Start: 2024-01-29 | End: 2024-01-29 | Stop reason: HOSPADM

## 2024-01-29 RX ORDER — PROPOFOL 10 MG/ML
INJECTION, EMULSION INTRAVENOUS CONTINUOUS PRN
Status: DISCONTINUED | OUTPATIENT
Start: 2024-01-29 | End: 2024-01-29

## 2024-01-29 RX ORDER — FENTANYL CITRATE 50 UG/ML
INJECTION, SOLUTION INTRAMUSCULAR; INTRAVENOUS PRN
Status: DISCONTINUED | OUTPATIENT
Start: 2024-01-29 | End: 2024-01-29

## 2024-01-29 RX ORDER — HYDROMORPHONE HYDROCHLORIDE 1 MG/ML
0.2 INJECTION, SOLUTION INTRAMUSCULAR; INTRAVENOUS; SUBCUTANEOUS EVERY 5 MIN PRN
Status: DISCONTINUED | OUTPATIENT
Start: 2024-01-29 | End: 2024-01-29 | Stop reason: HOSPADM

## 2024-01-29 RX ORDER — OXYCODONE HYDROCHLORIDE 5 MG/1
5 TABLET ORAL
Status: DISCONTINUED | OUTPATIENT
Start: 2024-01-29 | End: 2024-01-31 | Stop reason: HOSPADM

## 2024-01-29 RX ORDER — FENTANYL CITRATE 50 UG/ML
25 INJECTION, SOLUTION INTRAMUSCULAR; INTRAVENOUS EVERY 5 MIN PRN
Status: DISCONTINUED | OUTPATIENT
Start: 2024-01-29 | End: 2024-01-29 | Stop reason: HOSPADM

## 2024-01-29 RX ORDER — CYCLOPENTOLAT/TROPIC/PHENYLEPH 1%-1%-2.5%
1 DROPS (EA) OPHTHALMIC (EYE)
Status: COMPLETED | OUTPATIENT
Start: 2024-01-29 | End: 2024-01-29

## 2024-01-29 RX ORDER — ACETAMINOPHEN 325 MG/1
975 TABLET ORAL ONCE
Status: COMPLETED | OUTPATIENT
Start: 2024-01-29 | End: 2024-01-29

## 2024-01-29 RX ORDER — FENTANYL CITRATE 50 UG/ML
50 INJECTION, SOLUTION INTRAMUSCULAR; INTRAVENOUS EVERY 5 MIN PRN
Status: DISCONTINUED | OUTPATIENT
Start: 2024-01-29 | End: 2024-01-29 | Stop reason: HOSPADM

## 2024-01-29 RX ORDER — BALANCED SALT SOLUTION 6.4; .75; .48; .3; 3.9; 1.7 MG/ML; MG/ML; MG/ML; MG/ML; MG/ML; MG/ML
SOLUTION OPHTHALMIC PRN
Status: DISCONTINUED | OUTPATIENT
Start: 2024-01-29 | End: 2024-01-29 | Stop reason: HOSPADM

## 2024-01-29 RX ADMIN — SODIUM CHLORIDE, SODIUM LACTATE, POTASSIUM CHLORIDE, CALCIUM CHLORIDE: 600; 310; 30; 20 INJECTION, SOLUTION INTRAVENOUS at 10:36

## 2024-01-29 RX ADMIN — DICLOFENAC SODIUM 1 DROP: 1 SOLUTION/ DROPS OPHTHALMIC at 10:29

## 2024-01-29 RX ADMIN — Medication 200 MCG: at 11:43

## 2024-01-29 RX ADMIN — MOXIFLOXACIN 1 DROP: 5 SOLUTION/ DROPS OPHTHALMIC at 10:30

## 2024-01-29 RX ADMIN — Medication 1 DROP: at 10:29

## 2024-01-29 RX ADMIN — FENTANYL CITRATE 50 MCG: 50 INJECTION, SOLUTION INTRAMUSCULAR; INTRAVENOUS at 11:34

## 2024-01-29 RX ADMIN — Medication 1 DROP: at 10:39

## 2024-01-29 RX ADMIN — PROPARACAINE HYDROCHLORIDE 1 DROP: 5 SOLUTION/ DROPS OPHTHALMIC at 10:22

## 2024-01-29 RX ADMIN — LIDOCAINE HYDROCHLORIDE 80 MG: 20 INJECTION, SOLUTION INFILTRATION; PERINEURAL at 11:26

## 2024-01-29 RX ADMIN — ONDANSETRON 4 MG: 2 INJECTION INTRAMUSCULAR; INTRAVENOUS at 11:57

## 2024-01-29 RX ADMIN — DICLOFENAC SODIUM 1 DROP: 1 SOLUTION/ DROPS OPHTHALMIC at 10:22

## 2024-01-29 RX ADMIN — PROPOFOL 150 MG: 10 INJECTION, EMULSION INTRAVENOUS at 11:26

## 2024-01-29 RX ADMIN — DEXAMETHASONE SODIUM PHOSPHATE 4 MG: 4 INJECTION, SOLUTION INTRA-ARTICULAR; INTRALESIONAL; INTRAMUSCULAR; INTRAVENOUS; SOFT TISSUE at 11:26

## 2024-01-29 RX ADMIN — SODIUM CHLORIDE, SODIUM LACTATE, POTASSIUM CHLORIDE, CALCIUM CHLORIDE: 600; 310; 30; 20 INJECTION, SOLUTION INTRAVENOUS at 11:12

## 2024-01-29 RX ADMIN — MOXIFLOXACIN 1 DROP: 5 SOLUTION/ DROPS OPHTHALMIC at 10:39

## 2024-01-29 RX ADMIN — ACETAMINOPHEN 975 MG: 325 TABLET ORAL at 10:38

## 2024-01-29 RX ADMIN — Medication 1 DROP: at 10:22

## 2024-01-29 RX ADMIN — DICLOFENAC SODIUM 1 DROP: 1 SOLUTION/ DROPS OPHTHALMIC at 10:39

## 2024-01-29 RX ADMIN — PROPOFOL 200 MCG/KG/MIN: 10 INJECTION, EMULSION INTRAVENOUS at 11:30

## 2024-01-29 RX ADMIN — MOXIFLOXACIN 1 DROP: 5 SOLUTION/ DROPS OPHTHALMIC at 10:22
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
## 2024-01-29 NOTE — ANESTHESIA POSTPROCEDURE EVALUATION
Patient: Zeferino Odell    Procedure: Procedure(s):  LEFT EYE PHACOEMULSIFICATION, CATARACT, WITH INTRAOCULAR LENS IMPLANT  Remove suture eye post procedure, Right Eye       Anesthesia Type:  General    Note:  Disposition: Outpatient   Postop Pain Control: Uneventful            Sign Out: Well controlled pain   PONV: No   Neuro/Psych: Uneventful            Sign Out: Acceptable/Baseline neuro status   Airway/Respiratory: Uneventful            Sign Out: Acceptable/Baseline resp. status   CV/Hemodynamics: Uneventful            Sign Out: Acceptable CV status; No obvious hypovolemia; No obvious fluid overload   Other NRE:    DID A NON-ROUTINE EVENT OCCUR? No           Last vitals:  Vitals Value Taken Time   /71 01/29/24 1222   Temp 36.2  C (97.1  F) 01/29/24 1222   Pulse 67 01/29/24 1222   Resp 47 01/29/24 1222   SpO2 93 % 01/29/24 1222   Vitals shown include unfiled device data.    Electronically Signed By: Pineda Antunez MD  January 29, 2024  2:27 PM

## 2024-01-29 NOTE — ANESTHESIA PROCEDURE NOTES
Airway       Patient location during procedure: OR  Staff -        CRNA: Sarika Castro APRN CRNA       Performed By: CRNA  Consent for Airway        Urgency: elective  Indications and Patient Condition       Indications for airway management: tonia-procedural       Induction type:intravenous       Mask difficulty assessment: 0 - not attempted    Final Airway Details       Final airway type: supraglottic airway    Supraglottic Airway Details        Type: LMA       Brand: LMA Unique       LMA size: 5    Post intubation assessment        Placement verified by: capnometry and chest rise        Number of attempts at approach: 2 (I gel did not seat well converted to LMA unique)       Ease of procedure: easy       Dentition: Intact and Unchanged

## 2024-01-29 NOTE — ANESTHESIA CARE TRANSFER NOTE
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    Anesthesia Type:   General     Note:    Oropharynx: oropharynx clear of all foreign objects and spontaneously breathing  Level of Consciousness: awake and drowsy  Oxygen Supplementation: face mask  Level of Supplemental Oxygen (L/min / FiO2): 6  Independent Airway: airway patency satisfactory and stable  Dentition: dentition unchanged  Vital Signs Stable: post-procedure vital signs reviewed and stable  Report to RN Given: handoff report given  Patient transferred to: PACU    Handoff Report: Identifed the Patient, Identified the Reponsible Provider, Reviewed the pertinent medical history, Discussed the surgical course, Reviewed Intra-OP anesthesia mangement and issues during anesthesia, Set expectations for post-procedure period and Allowed opportunity for questions and acknowledgement of understanding    Vitals:  Vitals Value Taken Time   BP     Temp     Pulse     Resp     SpO2         Electronically Signed By: CINDY Underwood CRNA  January 29, 2024  12:10 PM

## 2024-01-29 NOTE — DISCHARGE INSTRUCTIONS
"[facility]  Home Care Following Anesthesia  For 24 hours after surgery:  Get plenty of rest.  A responsible adult must stay with you for at least 24 hours after you leave the surgery center.  Do not drive or use heavy equipment.  If you have weakness or tingling, don't drive or use heavy equipment until this feeling goes away.   Do not drink alcohol.   Avoid strenuous or risky activities.  Ask for help when climbing stairs.  You may feel lightheaded.  IF so, sit for a few minutes before standing.  Have someone help you get up.   If you have nausea (feel sick to your stomach): Drink only clear liquids such as apple juice, ginger ale, broth or 7-Up.  Rest may also help.  Be sure to drink enough fluids.  Move to a regular diet as you feel able.   You may have a slight fever.  Call the doctor if your fever is over 100 F (37.7 C) (taken under the tongue) or lasts longer than 24 hours.  You may have a dry mouth, a sore throat, muscle aches or trouble sleeping. These should go away after 24 hours.  Do not make important or legal decisions.   It is recommended to avoid smoking.        Today you received a Marcaine or bupivacaine block to numb the nerves near your surgery site.  This is a block using local anesthetic or \"numbing\" medication injected around the nerves to anesthetize or \"numb\" the area supplied by those nerves.  This block is injected into the muscle layer near your surgical site.  The medication may numb the location where you had surgery for 6-18 hours, but may last up to 24 hours.  If your surgical site is an arm or leg you should be careful with your affected limb, since it is possible to injure your limb without being aware of it due to the numbing.  Until full feeling returns, you should guard against bumping or hitting your limb, and avoid extreme hot or cold temperatures on the skin.  As the block wears off, the feeling will return as a tingling or prickly " sensation near your surgical site.  You will experience more discomfort from your incision as the feeling returns.  You may want to take a pain pill (a narcotic or Tylenol if this was prescribed by your surgeon) when you start to experience mild pain before the pain beccomes more severe.  If your pain medications do not control your pain you should notifiy your surgeon.    Tips for taking pain medications  To get the best pain relief possible, remember these points:  Take pain medications as directed, before pain becomes severe.  Pain medication can upset your stomach: taking it with food may help.  Constipation is a common side effect of pain medication. Drink plenty of  fluids.  Eat foods high in fiber. Take a stool softener if recommended by your doctor or pharmacist.  Do not drink alcohol, drive or operate machinery while taking pain medications.  Ask about other ways to control pain, such as with heat, ice or relaxation.    Tylenol/Acetaminophen Consumption    If you feel your pain relief is insufficient, you may take Tylenol/Acetaminophen in addition to your narcotic pain medication.   Be careful not to exceed 4,000 mg of Tylenol/Acetaminophen in a 24 hour period from all sources.  If you are taking extra strength Tylenol/acetaminophen (500 mg), the maximum dose is 8 tablets in 24 hours.  If you are taking regular strength acetaminophen (325 mg), the maximum dose is 12 tablets in 24 hours.    Call a doctor for any of the following:  Signs of infection (fever, growing tenderness at the surgery site, a large amount of drainage or bleeding, severe pain, foul-smelling drainage, redness, swelling).  It has been over 8 to 10 hours since surgery and you are still not able to urinate (pass water).  Headache for over 24 hours.  Signs of Covid-19 infection (temperature over 100 degrees, shortness of breath, cough, loss of taste/smell, generalized body aches, persistent headache, chills, sore throat,  nausea/vomiting/diarrhea)  Your doctor is:       Dr. Scot Gonzalez, Ophthalmology: [phone]               Or dial [phone] and ask for the resident on call for:  Ophthalmology  For emergency care, call the:  [facility]:  [phone] (TTY for hearing impaired: [phone])

## 2024-01-29 NOTE — OP NOTE
SURGEON:  Scot Corral MD  PREOPERATIVE DIAGNOSIS:  visually significant nucleosclerotic cataract left eye and history of cataract surgery with retained corneal suture right eye      POSTOPERATIVE DIAGNOSIS: same  NAME OF THE PROCEDURE: Phacoemulsification with intraocular lens implantation left eye and removal of corneal sutures right eye     ANESTHESIA: General anesthesia   COMPLICATIONS: none  INDICATIONS: Zeferino Odell is a 78 year old with diagnosis of visually significant cataract, here for cataract surgery    DESCRIPTION OF THE PROCEDURE:  The patient was taken to the operative room where general anesthesia was administered to the operative eye with adequate anesthesia and akinesia.    The operative eye was prepped and draped in the usual sterile surgical fashion for ophthalmic surgery, including the installation of one drop of 5% Povidone Iodine.  A sterile drape was placed over the face and body and a lid speculum was inserted.      With the use of a Supersharp blade and 0.12 forceps, a paracentesis was created at the 6 o'clock position, and viscoelastic was injected into the anterior chamber using a canula.  A 2.5 mm keratome was then used to construct a clear corneal incision at the 3 o'clock position.  Using Utrata forceps and cystotome needle, a continuous curvilinear capsulorrhexis was created and hydrodissection was undertaken with the use of BSS.  The nucleus was found to be freely mobile and then removed by phacoemulsification using a chop technique.  The remaining elements of cortex were then removed with irrigation/aspiration.  An IOL,was injected into the capsular bag and was rotated into a good position with a Sinskey hook. The remaining elements of viscoelastic were then removed with irrigation/aspiration. The wounds were hydrodissect and were watertight. Intracameral moxifloxacin was injected.  The lid speculum was removed. The eye was cleaned with wet and dry gauze. Maxitrol ointment was  placed on the eye.  A patch and shield were placed over the eye.     Next attention was turned to the right eye.one drop of betaine 5% was placed and a corneal suture was removed with a paracentesis blade and tiers forceps. More betadine 5% drops were applied at the end.     The patient was discharge in stable condition having tolerated the procedure well        Implant Name Type Inv. Item Serial No.  Lot No. LRB No. Used Action   LENS CC60WF 25.5 CL[employer]ON UV ASPHERIC BICONVEX IOL - [id number] Lens/Eye Implant LENS [id number] 25.5 [employer] UV ASPHERIC BICONVEX IOL [id number] NAE LABS  Left 1 Implanted

## 2024-01-29 NOTE — BRIEF OP NOTE
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  Complications: None.  Implants:   Implant Name Type Inv. Item Serial No.  Lot No. LRB No. Used Action   LENS CC60WF 25.5 [employer] UV ASPHERIC BICONVEX IOL - [id number] Lens/Eye Implant LENS CC60WF 25.5 [employer] UV ASPHERIC BICONVEX IOL [id number] NAE LABS  Left 1 Implanted

## 2024-01-30 ENCOUNTER — OFFICE VISIT (OUTPATIENT)
Dept: OPHTHALMOLOGY | Facility: CLINIC | Age: 79
End: 2024-01-30
Attending: OPHTHALMOLOGY
Payer: COMMERCIAL

## 2024-01-30 DIAGNOSIS — Z48.810 AFTERCARE FOLLOWING SURGERY OF A SENSE ORGAN: Primary | ICD-10-CM

## 2024-01-30 PROCEDURE — 99024 POSTOP FOLLOW-UP VISIT: CPT | Performed by: OPHTHALMOLOGY

## 2024-01-30 PROCEDURE — G0463 HOSPITAL OUTPT CLINIC VISIT: HCPCS | Performed by: OPHTHALMOLOGY

## 2024-01-30 ASSESSMENT — TONOMETRY
IOP_METHOD: TONOPEN
OS_IOP_MMHG: 13
OD_IOP_MMHG: 12

## 2024-01-30 ASSESSMENT — SLIT LAMP EXAM - LIDS
COMMENTS: NORMAL
COMMENTS: MILD EDEMA; NO TENDERNESS

## 2024-01-30 ASSESSMENT — EXTERNAL EXAM - RIGHT EYE: OD_EXAM: NORMAL

## 2024-01-30 ASSESSMENT — VISUAL ACUITY
OS_PH_SC: 20/25
OD_SC: 20/40-
OS_SC: 20/40-2
OD_PH_SC: 20/30-2
METHOD: SNELLEN - LINEAR

## 2024-01-30 NOTE — PATIENT INSTRUCTIONS
Right eye     Continue Prednisolone (white or pink top) 2/day right eye  Continue Ketorolac (gray top) 2/day right eye      Left eye   Moxifloxacin 4/day right eye   Prednisolone (white or pink top) 3/day right eye  Ketorolac (gray top) 3/day right eye

## 2024-01-30 NOTE — PROGRESS NOTES
S/P CE IOL left eye 1/29/24  S/P complex cataract extraction with iatrogenic iridodialysis right eye 12/11/23  S/P Secondary IOL and iridoplasty 1/15/24    Doing great   Vision improving   Right eye   Continue Prednisolone (white or pink top) 2/day right eye  Continue Ketorolac (gray top) 2/day right eye      Left eye   Moxifloxacin 4/day right eye   Prednisolone (white or pink top) 3/day right eye  Ketorolac (gray top) 3/day right eye      Retinoschisis ou  Appears stable compared to previous optos images; in OCT, there is a subretinal fluid component left eye but doesn't seem to be RRD    Pigmented retinal lesion left eye   Seen by Dr. Fox      Complete documentation of historical and exam elements from today's encounter can be found in the full encounter summary report (not reduplicated in this progress note). I personally obtained the chief complaint(s) and history of present illness.  I confirmed and edited as necessary the review of systems, past medical/surgical history, family history, social history, and examination findings as documented by others; and I examined the patient myself. I personally reviewed the relevant tests, images, and reports as documented above. I formulated and edited as necessary the assessment and plan and discussed the findings and management plan with the patient and family.    Scot Corral MD  , Vitreoretinal surgery   Department of Ophthalmology  [facility]

## 2024-02-07 ENCOUNTER — OFFICE VISIT (OUTPATIENT)
Dept: OPHTHALMOLOGY | Facility: CLINIC | Age: 79
End: 2024-02-07
Attending: OPHTHALMOLOGY
Payer: COMMERCIAL

## 2024-02-07 DIAGNOSIS — Z48.810 AFTERCARE FOLLOWING SURGERY OF A SENSE ORGAN: Primary | ICD-10-CM

## 2024-02-07 DIAGNOSIS — H26.491 PCO (POSTERIOR CAPSULE OPACIFICATION), RIGHT: ICD-10-CM

## 2024-02-07 PROCEDURE — 66821 AFTER CATARACT LASER SURGERY: CPT | Mod: RT | Performed by: OPHTHALMOLOGY

## 2024-02-07 PROCEDURE — G0463 HOSPITAL OUTPT CLINIC VISIT: HCPCS | Mod: 25 | Performed by: OPHTHALMOLOGY

## 2024-02-07 PROCEDURE — 250N000009 HC RX 250: Performed by: OPHTHALMOLOGY

## 2024-02-07 PROCEDURE — 99024 POSTOP FOLLOW-UP VISIT: CPT | Mod: 25 | Performed by: OPHTHALMOLOGY

## 2024-02-07 RX ADMIN — APRACLONIDINE HYDROCHLORIDE OPHTHALMIC SOLUTION 1 DROP: 10 SOLUTION/ DROPS OPHTHALMIC at 10:26

## 2024-02-07 ASSESSMENT — TONOMETRY
OS_IOP_MMHG: 16
IOP_METHOD: TONOPEN
OD_IOP_MMHG: 14

## 2024-02-07 ASSESSMENT — VISUAL ACUITY
OS_SC: 20/40
OS_PH_SC+: -1
OS_PH_SC: 20/30
OD_PH_SC+: -2
METHOD: SNELLEN - LINEAR
OD_SC: 20/40
OS_SC+: +2
OD_SC+: +1
OD_PH_SC: 20/25

## 2024-02-07 ASSESSMENT — SLIT LAMP EXAM - LIDS
COMMENTS: NORMAL
COMMENTS: MILD EDEMA; NO TENDERNESS

## 2024-02-07 ASSESSMENT — REFRACTION_WEARINGRX
OD_CYLINDER: +1.75
OD_SPHERE: -1.00
OD_AXIS: 033
OS_CYLINDER: SPHERE
OS_SPHERE: +0.25

## 2024-02-07 ASSESSMENT — EXTERNAL EXAM - RIGHT EYE: OD_EXAM: NORMAL

## 2024-02-07 NOTE — PROGRESS NOTES
S/P CE IOL left eye 1/29/24  S/P complex cataract extraction with iatrogenic iridodialysis right eye 12/11/23  S/P Secondary IOL and iridoplasty 1/15/24    Doing great; occasional FBS  Vision improving   Yag capsulotomy right eye today    Right eye   -Continue Prednisolone (white or pink top) 1/day right eye x 5 days and then stop  -Continue Ketorolac (gray top) 1/day right eye x 5 days and then stop   - Moxifloxacin 4/day for 2 days and then stop    Left eye   -Stop Moxifloxacin   -Taper Prednisolone (white or pink top) 3 times a day for one week, then two times a day for one week then once a day for one week then stop   -Taper Ketorolac (gray top) 3/day 3 times a day for one week, then two times a day for one week then once a day for one week then stop         Retinoschisis ou  Appears stable compared to previous optos images; in OCT, there is a subretinal fluid component left eye but doesn't seem to be RRD    Pigmented retinal lesion left eye   Seen by Dr. Fox    [facility] 3-4 months after coming back from [facility]       Complete documentation of historical and exam elements from today's encounter can be found in the full encounter summary report (not reduplicated in this progress note). I personally obtained the chief complaint(s) and history of present illness.  I confirmed and edited as necessary the review of systems, past medical/surgical history, family history, social history, and examination findings as documented by others; and I examined the patient myself. I personally reviewed the relevant tests, images, and reports as documented above. I formulated and edited as necessary the assessment and plan and discussed the findings and management plan with the patient and family.    Scot Corral MD  , Vitreoretinal surgery   Department of Ophthalmology  [facility]

## 2024-02-07 NOTE — NURSING NOTE
Chief Complaints and History of Present Illnesses   Patient presents with    Post Op (Ophthalmology) Both Eyes     LEFT EYE PHACOEMULSIFICATION, CATARACT, WITH INTRAOCULAR LENS IMPLANT 1/29/24  RIGHT EYE INSERTION, INTRAOCULAR LENS IMPLANT, SECONDARY   IRIDOPLASTY RIGHT EYE 1/15/24          Chief Complaint(s) and History of Present Illness(es)       Post Op (Ophthalmology) Both Eyes              Comments: LEFT EYE PHACOEMULSIFICATION, CATARACT, WITH INTRAOCULAR LENS IMPLANT 1/29/24  RIGHT EYE INSERTION, INTRAOCULAR LENS IMPLANT, SECONDARY   IRIDOPLASTY RIGHT EYE 1/15/24                   Comments    Pt states no problems or concerns  No eye pain     Peggy Kaplan COT 9:38 AM February 7, 2024

## 2024-02-07 NOTE — PATIENT INSTRUCTIONS
Right eye   -Continue Prednisolone (white or pink top) 1/day right eye x 5 days and then stop  -Continue Ketorolac (gray top) 1/day right eye x 5 days and then stop   - Moxifloxacin 4/day for 2 days and then stop    Left eye   -Stop Moxifloxacin   -Taper Prednisolone (white or pink top) 3 times a day for one week, then two times a day for one week then once a day for one week then stop   -Taper Ketorolac (gray top) 3/day 3 times a day for one week, then two times a day for one week then once a day for one week then stop

## 2024-02-13 ENCOUNTER — OFFICE VISIT (OUTPATIENT)
Dept: OPHTHALMOLOGY | Facility: CLINIC | Age: 79
End: 2024-02-13
Attending: OPHTHALMOLOGY
Payer: COMMERCIAL

## 2024-02-13 DIAGNOSIS — H33.193 BULLOUS RETINOSCHISIS OF BOTH EYES: ICD-10-CM

## 2024-02-13 DIAGNOSIS — Z48.810 AFTERCARE FOLLOWING SURGERY OF A SENSE ORGAN: Primary | ICD-10-CM

## 2024-02-13 DIAGNOSIS — H33.313 RETINAL TEAR OF BOTH EYES: Primary | ICD-10-CM

## 2024-02-13 PROCEDURE — 92250 FUNDUS PHOTOGRAPHY W/I&R: CPT | Performed by: OPHTHALMOLOGY

## 2024-02-13 PROCEDURE — 99207 FUNDUS PHOTOS OU (BOTH EYES): CPT | Mod: 26 | Performed by: OPHTHALMOLOGY

## 2024-02-13 PROCEDURE — G0463 HOSPITAL OUTPT CLINIC VISIT: HCPCS | Performed by: OPHTHALMOLOGY

## 2024-02-13 PROCEDURE — 99024 POSTOP FOLLOW-UP VISIT: CPT | Performed by: OPHTHALMOLOGY

## 2024-02-13 ASSESSMENT — SLIT LAMP EXAM - LIDS
COMMENTS: NORMAL
COMMENTS: MILD EDEMA; NO TENDERNESS

## 2024-02-13 ASSESSMENT — REFRACTION_MANIFEST
OD_CYLINDER: +0.75
OS_SPHERE: -1.00
OS_AXIS: 153
OD_AXIS: 002
OD_SPHERE: -0.25
OS_ADD: +2.00
OS_CYLINDER: +0.75
OD_ADD: +2.00

## 2024-02-13 ASSESSMENT — TONOMETRY
OS_IOP_MMHG: 15
OD_IOP_MMHG: 20
IOP_METHOD: TONOPEN

## 2024-02-13 ASSESSMENT — VISUAL ACUITY
OD_SC+: -3
OD_SC: 20/25
OS_SC: 20/40
OS_PH_CC: 20/30
OS_SC+: -3
METHOD: SNELLEN - LINEAR
OS_PH_CC+: -1

## 2024-02-13 ASSESSMENT — EXTERNAL EXAM - RIGHT EYE: OD_EXAM: NORMAL

## 2024-02-13 NOTE — PROGRESS NOTES
S/P CE IOL left eye 1/29/24  S/P complex cataract extraction with iatrogenic iridodialysis right eye 12/11/23  S/P Secondary IOL and iridoplasty 1/15/24    Doing great; occasional FBS  Vision improving     Right eye   -stop all drops    Left eye   -Taper Prednisolone (white or pink top) 2 times a day for one week, then once a day for one week then stop   -Taper Ketorolac (gray top) 2 times a day for one week, then once a day for one week then stop       Retinoschisis ou  Appears stable compared to previous optos images; in OCT, there is a subretinal fluid component left eye but doesn't seem to be RRD    Pigmented retinal lesion left eye   Seen by Dr. Fox    [facility] 3-4 months after coming back from [facility]       Complete documentation of historical and exam elements from today's encounter can be found in the full encounter summary report (not reduplicated in this progress note). I personally obtained the chief complaint(s) and history of present illness.  I confirmed and edited as necessary the review of systems, past medical/surgical history, family history, social history, and examination findings as documented by others; and I examined the patient myself. I personally reviewed the relevant tests, images, and reports as documented above. I formulated and edited as necessary the assessment and plan and discussed the findings and management plan with the patient and family.    Scot Corral MD  , Vitreoretinal surgery   Department of Ophthalmology  [facility]

## 2024-02-13 NOTE — PATIENT INSTRUCTIONS
Right eye   -stop all drops    Left eye   -Taper Prednisolone (white or pink top) 2 times a day for one week, then once a day for one week then stop   -Taper Ketorolac (gray top) 2 times a day for one week, then once a day for one week then stop

## 2024-02-13 NOTE — NURSING NOTE
Chief Complaints and History of Present Illnesses   Patient presents with    Post Op (Ophthalmology) Both Eyes     4 week post op 1/29/2024     Chief Complaint(s) and History of Present Illness(es)       Post Op (Ophthalmology) Both Eyes              Comments: 4 week post op 1/29/2024              Comments    Patient states vision is good both. Patient denies eye pain, flashes of light or floaters. Patient mentions having red eyes.     Patient has been using Prednisone 3x a day, Kerolac 3x a day left eye- and right eye is to finish treatment/drops  today.      Candida Cox 12:28 PM February 13, 2024

## 2024-05-28 DIAGNOSIS — H33.313 RETINAL TEAR OF BOTH EYES: Primary | ICD-10-CM

## 2024-06-12 ENCOUNTER — OFFICE VISIT (OUTPATIENT)
Dept: OPHTHALMOLOGY | Facility: CLINIC | Age: 79
End: 2024-06-12
Attending: OPHTHALMOLOGY
Payer: COMMERCIAL

## 2024-06-12 ENCOUNTER — VIRTUAL VISIT (OUTPATIENT)
Dept: INTERPRETER SERVICES | Facility: CLINIC | Age: 79
End: 2024-06-12

## 2024-06-12 DIAGNOSIS — H35.052 CHOROIDAL NEOVASCULARIZATION OF LEFT EYE: ICD-10-CM

## 2024-06-12 DIAGNOSIS — Z96.1 PSEUDOPHAKIA OF BOTH EYES: ICD-10-CM

## 2024-06-12 DIAGNOSIS — D31.32 CHOROIDAL NEVUS OF LEFT EYE: Primary | ICD-10-CM

## 2024-06-12 DIAGNOSIS — H33.193 BULLOUS RETINOSCHISIS OF BOTH EYES: ICD-10-CM

## 2024-06-12 DIAGNOSIS — H04.129 DRY EYE: ICD-10-CM

## 2024-06-12 PROCEDURE — G0463 HOSPITAL OUTPT CLINIC VISIT: HCPCS | Performed by: OPHTHALMOLOGY

## 2024-06-12 PROCEDURE — 92134 CPTRZ OPH DX IMG PST SGM RTA: CPT | Performed by: OPHTHALMOLOGY

## 2024-06-12 PROCEDURE — 99214 OFFICE O/P EST MOD 30 MIN: CPT | Performed by: OPHTHALMOLOGY

## 2024-06-12 PROCEDURE — T1013 SIGN LANG/ORAL INTERPRETER: HCPCS | Mod: U4,TEL,95

## 2024-06-12 PROCEDURE — 92015 DETERMINE REFRACTIVE STATE: CPT

## 2024-06-12 ASSESSMENT — REFRACTION_WEARINGRX
OS_AXIS: 153
OD_AXIS: 033
OS_CYLINDER: +0.75
OS_ADD: +2.00
OS_SPHERE: -1.00
OD_AXIS: 002
OS_CYLINDER: SPHERE
OD_ADD: +2.00
OD_CYLINDER: +0.75
OD_SPHERE: -0.25
OS_SPHERE: +0.25
OD_SPHERE: -1.00
OD_CYLINDER: +1.75

## 2024-06-12 ASSESSMENT — VISUAL ACUITY
METHOD: SNELLEN - LINEAR
OD_SC+: -1
OS_SC: 20/40
OD_SC: 20/20

## 2024-06-12 ASSESSMENT — REFRACTION_MANIFEST
OD_CYLINDER: +0.50
OS_CYLINDER: +0.75
OD_ADD: +2.25
OD_SPHERE: -0.75
OS_ADD: +2.25
OS_AXIS: 155
OS_SPHERE: -0.75
OD_AXIS: 030

## 2024-06-12 ASSESSMENT — SLIT LAMP EXAM - LIDS
COMMENTS: NORMAL
COMMENTS: MILD EDEMA; NO TENDERNESS

## 2024-06-12 ASSESSMENT — TONOMETRY
IOP_METHOD: TONOPEN
OS_IOP_MMHG: 14
OD_IOP_MMHG: 15

## 2024-06-12 ASSESSMENT — EXTERNAL EXAM - RIGHT EYE: OD_EXAM: NORMAL

## 2024-06-12 NOTE — NURSING NOTE
Chief Complaints and History of Present Illnesses   Patient presents with    Follow Up     Follow up for retina exam     Chief Complaint(s) and History of Present Illness(es)       Follow Up              Laterality: both eyes    Associated symptoms: tearing, photophobia and foreign body sensation.  Negative for flashes    Treatments tried: no treatments    Comments: Follow up for retina exam              Comments    The patient notes he cannot read well.  He notes he bought eye glasses but they are not working and he cannot read.  He is very sensitive to light.  He feels like something is in his eyes.  Lizeth Cruz, COA, COA 10:41 AM 06/12/2024

## 2024-06-12 NOTE — PROGRESS NOTES
S/P CE IOL left eye 1/29/24  S/P complex cataract extraction with iatrogenic iridodialysis right eye 12/11/23  S/P Secondary IOL and iridoplasty 1/15/24    Doing great; very happy with vision; notes occasional light sensitivity  Is not using any drops    A/P:    # Retinoschisis ou  Appears stable compared to previous optos images; in OCT, there is a stable subretinal fluid component left eye but doesn't seem to be RRD    Pigmented retinal lesion left eye   Mildly elevated with overlying drusen; neighboring SRF is likely in the setting of retinoschisis; will do b scan and will refer to Dr. Fox in future    Pseudophakia ou  Doing great despite complicated right eye surgery  VA corrects to 20/20 ou with a small refraction  Observe; glasses prescription given    Peripapillary choroidal neovascularization left eye  Asymptomatic and stable; observe with no tx   Return instructions given      RTC 4-6 months for DFE and OCT and optos ou and B scan inferior nevus left eye     Complete documentation of historical and exam elements from today's encounter can be found in the full encounter summary report (not reduplicated in this progress note). I personally obtained the chief complaint(s) and history of present illness.  I confirmed and edited as necessary the review of systems, past medical/surgical history, family history, social history, and examination findings as documented by others; and I examined the patient myself. I personally reviewed the relevant tests, images, and reports as documented above. I formulated and edited as necessary the assessment and plan and discussed the findings and management plan with the patient and family.    Scot Corral MD  , Vitreoretinal surgery   Department of Ophthalmology  [facility]

## 2024-07-19 ENCOUNTER — APPOINTMENT (OUTPATIENT)
Dept: OPTOMETRY | Facility: CLINIC | Age: 79
End: 2024-07-19
Payer: COMMERCIAL

## 2024-07-19 PROCEDURE — 92340 FIT SPECTACLES MONOFOCAL: CPT | Performed by: OPTOMETRIST

## 2024-12-10 DIAGNOSIS — H35.052 CHOROIDAL NEOVASCULARIZATION OF LEFT EYE: ICD-10-CM

## 2024-12-10 DIAGNOSIS — D31.32 CHOROIDAL NEVUS OF LEFT EYE: Primary | ICD-10-CM

## 2024-12-17 ENCOUNTER — OFFICE VISIT (OUTPATIENT)
Dept: OPHTHALMOLOGY | Facility: CLINIC | Age: 79
End: 2024-12-17
Attending: OPHTHALMOLOGY
Payer: COMMERCIAL

## 2024-12-17 DIAGNOSIS — H04.129 DRY EYE: ICD-10-CM

## 2024-12-17 DIAGNOSIS — H33.193 BULLOUS RETINOSCHISIS OF BOTH EYES: ICD-10-CM

## 2024-12-17 DIAGNOSIS — Z96.1 PSEUDOPHAKIA OF BOTH EYES: ICD-10-CM

## 2024-12-17 DIAGNOSIS — H35.052 CHOROIDAL NEOVASCULARIZATION OF LEFT EYE: Primary | ICD-10-CM

## 2024-12-17 DIAGNOSIS — D31.32 CHOROIDAL NEVUS OF LEFT EYE: ICD-10-CM

## 2024-12-17 PROCEDURE — 92134 CPTRZ OPH DX IMG PST SGM RTA: CPT | Performed by: OPHTHALMOLOGY

## 2024-12-17 PROCEDURE — G0463 HOSPITAL OUTPT CLINIC VISIT: HCPCS | Performed by: OPHTHALMOLOGY

## 2024-12-17 PROCEDURE — 92250 FUNDUS PHOTOGRAPHY W/I&R: CPT | Performed by: OPHTHALMOLOGY

## 2024-12-17 ASSESSMENT — REFRACTION_WEARINGRX
OS_ADD: +2.25
OD_SPHERE: -0.75
OS_CYLINDER: +0.75
OD_CYLINDER: +0.50
OD_ADD: +2.25
OS_AXIS: 155
SPECS_TYPE: LAST MRX IN PHOROPTER
OD_AXIS: 030
OS_SPHERE: -0.75

## 2024-12-17 ASSESSMENT — VISUAL ACUITY
OS_CC: 20/30-2/+
METHOD: SNELLEN - LINEAR
OD_CC: 20/25+2

## 2024-12-17 ASSESSMENT — SLIT LAMP EXAM - LIDS
COMMENTS: NORMAL
COMMENTS: NORMAL

## 2024-12-17 ASSESSMENT — EXTERNAL EXAM - RIGHT EYE: OD_EXAM: NORMAL

## 2024-12-17 ASSESSMENT — TONOMETRY
IOP_METHOD: TONOPEN
OS_IOP_MMHG: 13
OD_IOP_MMHG: 15

## 2024-12-17 NOTE — PROGRESS NOTES
Interval History Dec 17, 2024: LCV 6/2024. Pt states he sometimes gets a burning feeling and an occasional juarez/gritty feeling in his eyes accompanied by redness. He also notes a clouding/blurred vision of both eyes. Endorses a new spiderweb looking thing in his right eye x 1-1.5 months, occasional floaters in his right eye, rare flashes. Oc meds: AT's 1-2x/day each eye       POH:  S/P CE IOL left eye 1/29/24  S/P complex cataract extraction with iatrogenic iridodialysis right eye 12/11/23  S/P Secondary IOL and iridoplasty 1/15/24      12/17/24 OCT macula   OD: inf focal EZ loss 55 deg, tr ERM, interval PVD  OS: peripapillary CNVM with stable IRF;  inf large raised lesion with shadow and with overlying drusen and SRF further inferior - stable    A/P:    Retinoschisis OU  ST OS stable; IT stable    Choroidal nevus OS  Mildly elevated with overlying drusen; neighboring SRF is likely in the setting of retinoschisis; 1/2024 per [facility] monitor for growth and refer back PRN    Pseudophakia ou  VA down OU 2/2 surface vs fluctuation    Peripapillary choroidal neovascularization left eye  Asymptomatic and stable; observe with no tx   Return instructions given    SANTINO OU  Increase AT to QID   Add AT narinder QHS OU    PVD OD  Likely ~10/2024 per symptoms; no RT/RD on exam today  Retinal return precautions discussed    DCL OU      RTC 6 months for DFE and OCT and optos ou and B scan inferior nevus left eye     Sigifredo Echeverria MD  Vitreoretinal Surgery Fellow     Complete documentation of historical and exam elements from today's encounter can be found in the full encounter summary report (not reduplicated in this progress note). I personally obtained the chief complaint(s) and history of present illness.  I confirmed and edited as necessary the review of systems, past medical/surgical history, family history, social history, and examination findings as documented by others; and I examined the patient myself. I personally reviewed  the relevant tests, images, and reports as documented above. I formulated and edited as necessary the assessment and plan and discussed the findings and management plan with the patient and family.    Scot Corral MD

## 2025-06-04 DIAGNOSIS — H35.052 CHOROIDAL NEOVASCULARIZATION OF LEFT EYE: Primary | ICD-10-CM

## 2025-06-16 ENCOUNTER — OFFICE VISIT (OUTPATIENT)
Dept: FAMILY MEDICINE | Facility: CLINIC | Age: 80
End: 2025-06-16
Payer: COMMERCIAL

## 2025-06-16 VITALS
SYSTOLIC BLOOD PRESSURE: 127 MMHG | DIASTOLIC BLOOD PRESSURE: 74 MMHG | RESPIRATION RATE: 16 BRPM | WEIGHT: 170 LBS | TEMPERATURE: 98 F | HEART RATE: 65 BPM | HEIGHT: 69 IN | OXYGEN SATURATION: 95 % | BODY MASS INDEX: 25.18 KG/M2

## 2025-06-16 DIAGNOSIS — H61.22 IMPACTED CERUMEN OF LEFT EAR: Primary | ICD-10-CM

## 2025-06-16 PROCEDURE — 3078F DIAST BP <80 MM HG: CPT

## 2025-06-16 PROCEDURE — 69209 REMOVE IMPACTED EAR WAX UNI: CPT | Mod: LT

## 2025-06-16 PROCEDURE — 3074F SYST BP LT 130 MM HG: CPT

## 2025-06-16 NOTE — PROGRESS NOTES
"Office Visit:    Assessment & Plan     Impacted cerumen of left ear:  - Lavage today.   - Discussed debrox for re-occurrence.         Consent was obtained from the patient to use an AI documentation tool in the creation of this note.          BMI  Estimated body mass index is 25.47 kg/m  as calculated from the following:    Height as of this encounter: 1.74 m (5' 8.5\").    Weight as of this encounter: 77.1 kg (170 lb).       Patient should follow up PRN for new or worsening symptoms. All questions answered to patient's satisfaction. Warning signs of when to seek emergency care were discussed.       Sathish Mcgarry is a 80 year old, presenting for the following health issues:  Ear Problem        6/16/2025    10:01 AM   Additional Questions   Roomed by Reji Akers   Accompanied by Daughter      Ear Problem    History of Present Illness       Reason for visit:  Ears are clogged with ear wax         History of Present Illness-  Zeferino Odell, 80-year-old male  - Has had ear blockage with wax, occurring approximately once a year.  - Reports feeling that both ears are clogged, with the left ear being worse than the right.  - No fever or dizziness reported.  - Used ear drops (debrox) for a couple of nights prior to the visit.  - Not interested in seeing ENT.                Review of Systems  Constitutional, HEENT, cardiovascular, pulmonary, gi and gu systems are negative, except as otherwise noted.      Objective    There were no vitals taken for this visit.  There is no height or weight on file to calculate BMI.  Physical Exam   EYES: Eyes grossly normal to inspection, PERRL and conjunctivae and sclerae normal  HENT: right ear: normal: no effusions, no erythema, normal landmarks, left ear: cerumen impaction, nose and mouth without ulcers or lesions, oropharynx clear, and oral mucous membranes moist  NECK: no adenopathy, no asymmetry, masses, or scars  RESP: lungs clear to auscultation - no rales, rhonchi or " wheezes  CV: regular rate and rhythm, normal S1 S2, no S3 or S4, no murmur, click or rub, no peripheral edema  MS: no gross musculoskeletal defects noted, no edema  NEURO: Normal strength and tone, mentation intact and speech normal            Signed Electronically by: CINDY Aldana CNP

## 2025-06-16 NOTE — PATIENT INSTRUCTIONS
Earwax Blockage: Care Instructions  Overview     Earwax is a natural substance that protects the ear canal. Normally, earwax drains from the ears and does not cause problems. Sometimes earwax builds up in the ear canal and hardens. Earwax blockage (also called cerumen impaction) can cause some loss of hearing and pain. When wax is tightly packed, you will need to have your doctor remove it.  Follow-up care is a key part of your treatment and safety. Be sure to make and go to all appointments, and call your doctor if you are having problems. It's also a good idea to know your test results and keep a list of the medicines you take.  How can you care for yourself at home?  Do not try to remove earwax with cotton swabs, fingers, or other objects. This can make the blockage worse and damage the eardrum.  If your doctor recommends that you try to remove earwax at home:  Soften and loosen the earwax with warm mineral oil. You also can try hydrogen peroxide mixed with an equal amount of room temperature water. Place 2 drops of the fluid, warmed to body temperature, in the ear two times a day for up to 5 days.  Once the wax is loose and soft, all that is usually needed to remove it from the ear canal is a gentle, warm shower. Direct the water into the ear, then tip your head to let the earwax drain out. Use a towel to gently dry your ear.  If the warm mineral oil and shower do not work, use an over-the-counter wax softener. Read and follow all instructions on the label. After using the wax softener, use an ear syringe to gently flush the ear. Make sure the flushing solution is body temperature. Cool or hot fluids in the ear can cause dizziness.  When should you call for help?   Call your doctor now or seek immediate medical care if:    Pus or blood drains from your ear.     Your ears are ringing or feel full.     You have a loss of hearing.   Watch closely for changes in your health, and be sure to contact your doctor if:     You have pain or reduced hearing after 1 week of home treatment.     You have any new symptoms, such as nausea or balance problems.

## 2025-07-16 ENCOUNTER — OFFICE VISIT (OUTPATIENT)
Dept: OPHTHALMOLOGY | Facility: CLINIC | Age: 80
End: 2025-07-16
Attending: OPHTHALMOLOGY
Payer: COMMERCIAL

## 2025-07-16 DIAGNOSIS — H33.193 BULLOUS RETINOSCHISIS OF BOTH EYES: ICD-10-CM

## 2025-07-16 DIAGNOSIS — H04.129 DRY EYE: ICD-10-CM

## 2025-07-16 DIAGNOSIS — Z96.1 PSEUDOPHAKIA OF BOTH EYES: ICD-10-CM

## 2025-07-16 DIAGNOSIS — H33.313 RETINAL TEAR OF BOTH EYES: ICD-10-CM

## 2025-07-16 DIAGNOSIS — H35.052 CHOROIDAL NEOVASCULARIZATION OF LEFT EYE: ICD-10-CM

## 2025-07-16 DIAGNOSIS — H02.88B MEIBOMIAN GLAND DYSFUNCTION (MGD) OF UPPER AND LOWER LIDS OF BOTH EYES: ICD-10-CM

## 2025-07-16 DIAGNOSIS — H02.88A MEIBOMIAN GLAND DYSFUNCTION (MGD) OF UPPER AND LOWER LIDS OF BOTH EYES: ICD-10-CM

## 2025-07-16 DIAGNOSIS — D31.32 CHOROIDAL NEVUS OF LEFT EYE: Primary | ICD-10-CM

## 2025-07-16 PROCEDURE — G0463 HOSPITAL OUTPT CLINIC VISIT: HCPCS | Performed by: OPHTHALMOLOGY

## 2025-07-16 PROCEDURE — 92134 CPTRZ OPH DX IMG PST SGM RTA: CPT | Performed by: OPHTHALMOLOGY

## 2025-07-16 PROCEDURE — 99207 FUNDUS PHOTOS OU (BOTH EYES): CPT | Mod: 26 | Performed by: OPHTHALMOLOGY

## 2025-07-16 PROCEDURE — 92250 FUNDUS PHOTOGRAPHY W/I&R: CPT | Performed by: OPHTHALMOLOGY

## 2025-07-16 PROCEDURE — 92014 COMPRE OPH EXAM EST PT 1/>: CPT | Performed by: OPHTHALMOLOGY

## 2025-07-16 PROCEDURE — 76512 OPH US DX B-SCAN: CPT | Performed by: OPHTHALMOLOGY

## 2025-07-16 ASSESSMENT — REFRACTION_WEARINGRX
OD_SPHERE: -0.75
OS_AXIS: 155
OS_CYLINDER: +0.75
OD_ADD: +2.25
SPECS_TYPE: LAST MRX IN PHOROPTER
OD_AXIS: 030
OS_ADD: +2.25
OD_CYLINDER: +0.50
OS_SPHERE: -0.75

## 2025-07-16 ASSESSMENT — VISUAL ACUITY
METHOD: SNELLEN - LINEAR
OD_CC: 20/25
OS_CC: 20/40

## 2025-07-16 ASSESSMENT — SLIT LAMP EXAM - LIDS
COMMENTS: NORMAL
COMMENTS: NORMAL

## 2025-07-16 ASSESSMENT — TONOMETRY
OS_IOP_MMHG: 16
OD_IOP_MMHG: 18
IOP_METHOD: TONOPEN

## 2025-07-16 ASSESSMENT — EXTERNAL EXAM - RIGHT EYE: OD_EXAM: NORMAL

## 2025-07-16 NOTE — NURSING NOTE
"Chief Complaints and History of Present Illnesses   Patient presents with    Follow Up     Choroidal neovascularization of left eye     Chief Complaint(s) and History of Present Illness(es)       Follow Up              Comments: Choroidal neovascularization of left eye              Comments    Pt states no change in VA since last visit  C/o cloudy in the the right eye and some burning and tearing right eye > Left eye   Uses AT's once daily  States he has some \"spider thing\" in the right eye \"  No flashes     Peggy Kaplan COT 7:53 AM July 16, 2025                              "

## 2025-07-16 NOTE — PROGRESS NOTES
#[id number]    Interval History Jul 16, 2025: LCV 12/17/2024. Pt states he sometimes gets a burning feeling and an occasional juarez/gritty feeling in his eyes accompanied by redness. He also notes a clouding/blurred vision of both eyes.   Oc meds: AT's 1-2x/day each eye       POH:  S/P CE IOL left eye 1/29/24  S/P complex cataract extraction with iatrogenic iridodialysis right eye 12/11/23  S/P Secondary IOL and iridoplasty 1/15/24      7/16/25 OCT macula   OD: inf focal EZ loss 55 deg, tr ERM, interval PVD  OS: peripapillary CNVM with stable IRF;  inf large raised lesion with shadow and with overlying drusen; resolved SRF    A/P:    Retinoschisis OU  ST OS stable; IT stable    Choroidal nevus OS  Mildly elevated with overlying drusen; neighboring SRF is likely in the setting of retinoschisis; 1/2024 per [facility]K monitor for growth and refer back PRN    Pseudophakia ou  VA down OU 2/2 surface vs fluctuation    Peripapillary choroidal neovascularization left eye  Asymptomatic and stable; observe with no tx   Return instructions given    SANTINO each eye  MGC OU  Eyelid margin scrub instruction given  continue AT to QID   Add AT narinder QHS OU    PVD OD  Likely ~10/2024 per symptoms; no RT/RD on exam today  Retinal return precautions discussed      RTC   Tech only appt for Mrx   12 months for DFE and OCT and optos ou and B scan inferior nevus left eye       Complete documentation of historical and exam elements from today's encounter can be found in the full encounter summary report (not reduplicated in this progress note). I personally obtained the chief complaint(s) and history of present illness.  I confirmed and edited as necessary the review of systems, past medical/surgical history, family history, social history, and examination findings as documented by others; and I examined the patient myself. I personally reviewed the relevant tests, images, and reports as documented above. I formulated and edited as necessary  the assessment and plan and discussed the findings and management plan with the patient and family.    Scot Corral MD

## 2025-07-24 ENCOUNTER — ALLIED HEALTH/NURSE VISIT (OUTPATIENT)
Dept: OPHTHALMOLOGY | Facility: CLINIC | Age: 80
End: 2025-07-24
Attending: OPHTHALMOLOGY
Payer: COMMERCIAL

## 2025-07-24 DIAGNOSIS — Z96.1 PSEUDOPHAKIA OF BOTH EYES: Primary | ICD-10-CM

## 2025-07-24 PROCEDURE — 92015 DETERMINE REFRACTIVE STATE: CPT

## 2025-07-24 ASSESSMENT — REFRACTION_MANIFEST
OS_AXIS: 165
OD_CYLINDER: +0.75
OD_AXIS: 025
OS_SPHERE: -0.75
OD_ADD: +2.50
OS_ADD: +2.50
OS_CYLINDER: +1.50
OD_SPHERE: -1.25

## 2025-07-24 ASSESSMENT — REFRACTION_WEARINGRX
OD_ADD: +2.25
OS_SPHERE: +1.50
OS_CYLINDER: SPHERE
SPECS_TYPE: OTC READER
OD_SPHERE: +1.50
OD_SPHERE: -0.75
SPECS_TYPE: LAST MRX
OD_AXIS: 030
OS_ADD: +2.25
OS_AXIS: 155
OD_CYLINDER: SPHERE
OD_CYLINDER: +0.50
OS_SPHERE: -0.75
OS_CYLINDER: +0.75

## 2025-07-24 ASSESSMENT — VISUAL ACUITY
METHOD: SNELLEN - LINEAR
OS_PH_SC+: -2
OD_SC: 20/40
OS_SC: 20/50
OD_PH_SC: 20/30
OS_PH_SC: 20/30

## 2025-08-06 ENCOUNTER — APPOINTMENT (OUTPATIENT)
Dept: OPTOMETRY | Facility: CLINIC | Age: 80
End: 2025-08-06
Payer: COMMERCIAL

## 2025-08-06 PROCEDURE — 92341 FIT SPECTACLES BIFOCAL: CPT | Performed by: OPTOMETRIST

## (undated) DEVICE — EYE CANN IRR 30GA  ANTERIOR CHAMBER 581273

## (undated) DEVICE — GLOVE BIOGEL PI MICRO SZ 7.5 48575

## (undated) DEVICE — SU PLAIN 6-0 TG140-8 18" 1735G

## (undated) DEVICE — PACK CATARACT UMMC

## (undated) DEVICE — ESU HIGH TEMP LOOP TIP AA03

## (undated) DEVICE — STRAP KNEE/BODY 31143004

## (undated) DEVICE — EYE KNIFE STILETTO VISITEC 1.1MM ANG 45DEG SIDEPORT 376620

## (undated) DEVICE — EYE NDL RETROBULBAR ATKINSON 25GA 1.5" 581637

## (undated) DEVICE — TAPE TRANSPORE 1"X1.5YD 1534S-1

## (undated) DEVICE — TAPE MICROPORE 1"X1.5YD 1530S-1

## (undated) DEVICE — POSITIONER ARMBOARD FOAM 1PAIR LF FP-ARMB1

## (undated) DEVICE — EYE PACK CUSTOM ANTERIOR 30DEG TIP CENTURION PPK6682-04

## (undated) DEVICE — EYE KNIFE CRESCENT 55DEG 373807

## (undated) DEVICE — SYR 05ML LL W/O NDL

## (undated) DEVICE — PEN MARKING SKIN FINE 31145942

## (undated) DEVICE — LINEN TOWEL PACK X5 5464

## (undated) DEVICE — EYE PACK 25GA CONSTELLATION 10,000 CPM PPK9380-02

## (undated) DEVICE — EYE PREP BETADINE 5% SOLUTION 30ML 0065-0411-30

## (undated) DEVICE — EYE CANN IRRIG CORTICAL CLVNG HYDRDISCTR 27GAX7/8" BC585158

## (undated) DEVICE — LENS CC60WF 25.0 CLAREON UV ASPHERIC BICONVEX IOL: Type: IMPLANTABLE DEVICE | Site: EYE | Status: NON-FUNCTIONAL

## (undated) DEVICE — Device

## (undated) DEVICE — SOL NACL 0.9% 10ML VIAL 0409-4888-02

## (undated) DEVICE — SOL WATER IRRIG 500ML BOTTLE 2F7113

## (undated) DEVICE — EYE KNIFE SLIT XSTAR VISITEC 2.8MM 45DEG 373728

## (undated) DEVICE — SU ETHILON 10-0 CS160-6 12" 9000G

## (undated) DEVICE — EYE SHIELD PLASTIC

## (undated) DEVICE — EYE SHIELD PLASTIC CLEAR UNIVERSAL K9-6050

## (undated) DEVICE — EYE CANN IRR 25GA CYSTOTOME 581610

## (undated) DEVICE — EYE DRSG PAD OVAL

## (undated) DEVICE — NDL 18GA 1.5" 305196

## (undated) DEVICE — PACK VITRECTOMY/RET CUSTOM ASC PQ15VRU12

## (undated) DEVICE — EYE TIP IRRIGATION & ASPIRATION POLYMER CVD 0.3MM 8065751512

## (undated) DEVICE — SYR 01ML LL W/O NDL LATEX FREE 309628

## (undated) DEVICE — EYE TIP IRRIGATION & ASPIRATION POLYMER 35D BENT 8065751511

## (undated) DEVICE — EYE KNIFE SLIT XSTAR VISITEC 2.5MM 45DEG BEVEL UP 373725

## (undated) DEVICE — EYE FORCEPS TIP MAX GRIP ADV DISP 25+ 725.13P

## (undated) DEVICE — PACK CATARACT CUSTOM ASC SEY15CPUMC

## (undated) DEVICE — EYE DRAPE MICROSCOPE UNIVERSAL (BIOM FULL) 08-MK55140

## (undated) DEVICE — SU ETHILON 10-0 CSM-6DA 8" 9006

## (undated) RX ORDER — ACETAMINOPHEN 325 MG/1
TABLET ORAL
Status: DISPENSED
Start: 2023-12-11

## (undated) RX ORDER — CYCLOPENTOLAT/TROPIC/PHENYLEPH 1%-1%-2.5%
DROPS (EA) OPHTHALMIC (EYE)
Status: DISPENSED
Start: 2023-12-11

## (undated) RX ORDER — PROPOFOL 10 MG/ML
INJECTION, EMULSION INTRAVENOUS
Status: DISPENSED
Start: 2024-01-15

## (undated) RX ORDER — DEXAMETHASONE SODIUM PHOSPHATE 4 MG/ML
INJECTION, SOLUTION INTRA-ARTICULAR; INTRALESIONAL; INTRAMUSCULAR; INTRAVENOUS; SOFT TISSUE
Status: DISPENSED
Start: 2024-01-15

## (undated) RX ORDER — PROPARACAINE HYDROCHLORIDE 5 MG/ML
SOLUTION/ DROPS OPHTHALMIC
Status: DISPENSED
Start: 2023-12-11

## (undated) RX ORDER — FENTANYL CITRATE 50 UG/ML
INJECTION, SOLUTION INTRAMUSCULAR; INTRAVENOUS
Status: DISPENSED
Start: 2024-01-29

## (undated) RX ORDER — GLYCOPYRROLATE 0.2 MG/ML
INJECTION INTRAMUSCULAR; INTRAVENOUS
Status: DISPENSED
Start: 2024-01-15

## (undated) RX ORDER — FENTANYL CITRATE 50 UG/ML
INJECTION, SOLUTION INTRAMUSCULAR; INTRAVENOUS
Status: DISPENSED
Start: 2023-12-11

## (undated) RX ORDER — PROPOFOL 10 MG/ML
INJECTION, EMULSION INTRAVENOUS
Status: DISPENSED
Start: 2024-01-29

## (undated) RX ORDER — FENTANYL CITRATE 50 UG/ML
INJECTION, SOLUTION INTRAMUSCULAR; INTRAVENOUS
Status: DISPENSED
Start: 2024-01-15

## (undated) RX ORDER — ACETAMINOPHEN 325 MG/1
TABLET ORAL
Status: DISPENSED
Start: 2024-01-15

## (undated) RX ORDER — FENTANYL CITRATE-0.9 % NACL/PF 10 MCG/ML
PLASTIC BAG, INJECTION (ML) INTRAVENOUS
Status: DISPENSED
Start: 2024-01-15

## (undated) RX ORDER — ONDANSETRON 2 MG/ML
INJECTION INTRAMUSCULAR; INTRAVENOUS
Status: DISPENSED
Start: 2024-01-15

## (undated) RX ORDER — ACETAMINOPHEN 325 MG/1
TABLET ORAL
Status: DISPENSED
Start: 2024-01-29